# Patient Record
Sex: FEMALE | Race: WHITE | Employment: FULL TIME | URBAN - METROPOLITAN AREA
[De-identification: names, ages, dates, MRNs, and addresses within clinical notes are randomized per-mention and may not be internally consistent; named-entity substitution may affect disease eponyms.]

---

## 2018-09-06 ENCOUNTER — HOSPITAL ENCOUNTER (OUTPATIENT)
Dept: GENERAL RADIOLOGY | Facility: CLINIC | Age: 28
Discharge: HOME | End: 2018-09-08
Payer: COMMERCIAL

## 2018-09-06 ENCOUNTER — HOSPITAL ENCOUNTER (OUTPATIENT)
Facility: CLINIC | Age: 28
Discharge: HOME | End: 2018-09-08
Payer: COMMERCIAL

## 2018-09-06 DIAGNOSIS — M25.572 LEFT ANKLE PAIN, UNSPECIFIED CHRONICITY: ICD-10-CM

## 2018-09-06 PROCEDURE — 73610 X-RAY EXAM OF ANKLE: CPT

## 2019-12-16 ENCOUNTER — HOSPITAL ENCOUNTER (OUTPATIENT)
Age: 29
Discharge: HOME | End: 2019-12-16
Payer: COMMERCIAL

## 2019-12-16 LAB
ALBUMIN SERPL-MCNC: 3.9 G/DL (ref 3.5–5.2)
ALBUMIN/GLOBULIN RATIO: 1.1 (ref 1–2.5)
ALP BLD-CCNC: 70 U/L (ref 35–104)
ALT SERPL-CCNC: 23 U/L (ref 5–33)
ANION GAP SERPL CALCULATED.3IONS-SCNC: 14 MMOL/L (ref 9–17)
AST SERPL-CCNC: 20 U/L
BILIRUB SERPL-MCNC: 0.28 MG/DL (ref 0.3–1.2)
BILIRUBIN DIRECT: <0.08 MG/DL
BILIRUBIN, INDIRECT: ABNORMAL MG/DL (ref 0–1)
BUN BLDV-MCNC: 7 MG/DL (ref 6–20)
CALCIUM SERPL-MCNC: 9 MG/DL (ref 8.6–10.4)
CHLORIDE BLD-SCNC: 108 MMOL/L (ref 98–107)
CHOLESTEROL/HDL RATIO: 3.1
CHOLESTEROL: 154 MG/DL
CO2: 19 MMOL/L (ref 20–31)
CREAT SERPL-MCNC: 0.61 MG/DL (ref 0.5–0.9)
ESTIMATED AVERAGE GLUCOSE: 97 MG/DL
GFR AFRICAN AMERICAN: >60 ML/MIN
GFR NON-AFRICAN AMERICAN: >60 ML/MIN
GFR SERPL CREATININE-BSD FRML MDRD: NORMAL ML/MIN/{1.73_M2}
GFR SERPL CREATININE-BSD FRML MDRD: NORMAL ML/MIN/{1.73_M2}
GLOBULIN: ABNORMAL G/DL (ref 1.5–3.8)
GLUCOSE BLD-MCNC: 89 MG/DL (ref 70–99)
HBA1C MFR BLD: 5 % (ref 4–6)
HCT VFR BLD CALC: 39.1 % (ref 36.3–47.1)
HDLC SERPL-MCNC: 50 MG/DL
HEMOGLOBIN: 12.8 G/DL (ref 11.9–15.1)
IRON: 44 UG/DL (ref 37–145)
LDL CHOLESTEROL: 85 MG/DL (ref 0–130)
MAGNESIUM: 2.3 MG/DL (ref 1.6–2.6)
MCH RBC QN AUTO: 27.8 PG (ref 25.2–33.5)
MCHC RBC AUTO-ENTMCNC: 32.7 G/DL (ref 28.4–34.8)
MCV RBC AUTO: 85 FL (ref 82.6–102.9)
NRBC AUTOMATED: 0 PER 100 WBC
PDW BLD-RTO: 13.3 % (ref 11.8–14.4)
PLATELET # BLD: 337 K/UL (ref 138–453)
PMV BLD AUTO: 9.8 FL (ref 8.1–13.5)
POTASSIUM SERPL-SCNC: 4.3 MMOL/L (ref 3.7–5.3)
RBC # BLD: 4.6 M/UL (ref 3.95–5.11)
SODIUM BLD-SCNC: 141 MMOL/L (ref 135–144)
T3 TOTAL: 197 NG/DL (ref 80–200)
THYROXINE, FREE: 1.13 NG/DL (ref 0.93–1.7)
TOTAL PROTEIN: 7.3 G/DL (ref 6.4–8.3)
TRIGL SERPL-MCNC: 95 MG/DL
TSH SERPL DL<=0.05 MIU/L-ACNC: 2.42 MIU/L (ref 0.3–5)
VITAMIN B-12: 290 PG/ML (ref 232–1245)
VITAMIN D 25-HYDROXY: 21.3 NG/ML (ref 30–100)
VLDLC SERPL CALC-MCNC: NORMAL MG/DL (ref 1–30)
WBC # BLD: 10.4 K/UL (ref 3.5–11.3)

## 2019-12-16 PROCEDURE — 82306 VITAMIN D 25 HYDROXY: CPT

## 2019-12-16 PROCEDURE — 83540 ASSAY OF IRON: CPT

## 2019-12-16 PROCEDURE — 36415 COLL VENOUS BLD VENIPUNCTURE: CPT

## 2019-12-16 PROCEDURE — 80061 LIPID PANEL: CPT

## 2019-12-16 PROCEDURE — 82310 ASSAY OF CALCIUM: CPT

## 2019-12-16 PROCEDURE — 85027 COMPLETE CBC AUTOMATED: CPT

## 2019-12-16 PROCEDURE — 82607 VITAMIN B-12: CPT

## 2019-12-16 PROCEDURE — 80051 ELECTROLYTE PANEL: CPT

## 2019-12-16 PROCEDURE — 84480 ASSAY TRIIODOTHYRONINE (T3): CPT

## 2019-12-16 PROCEDURE — 82947 ASSAY GLUCOSE BLOOD QUANT: CPT

## 2019-12-16 PROCEDURE — 83735 ASSAY OF MAGNESIUM: CPT

## 2019-12-16 PROCEDURE — 80076 HEPATIC FUNCTION PANEL: CPT

## 2019-12-16 PROCEDURE — 84520 ASSAY OF UREA NITROGEN: CPT

## 2019-12-16 PROCEDURE — 84439 ASSAY OF FREE THYROXINE: CPT

## 2019-12-16 PROCEDURE — 83036 HEMOGLOBIN GLYCOSYLATED A1C: CPT

## 2019-12-16 PROCEDURE — 84443 ASSAY THYROID STIM HORMONE: CPT

## 2019-12-16 PROCEDURE — 82565 ASSAY OF CREATININE: CPT

## 2020-01-13 PROBLEM — E66.9 OBESITY WITH BODY MASS INDEX (BMI) OF 30.0 TO 39.9: Status: ACTIVE | Noted: 2020-01-13

## 2020-01-21 ENCOUNTER — HOSPITAL ENCOUNTER (OUTPATIENT)
Age: 30
Setting detail: SPECIMEN
Discharge: HOME | End: 2020-01-21
Payer: COMMERCIAL

## 2020-01-21 LAB
C-REACTIVE PROTEIN: 10.5 MG/L (ref 0–5)
SEDIMENTATION RATE, ERYTHROCYTE: 15 MM (ref 0–20)

## 2020-01-22 LAB — ANTI-NUCLEAR ANTIBODY (ANA): NEGATIVE

## 2020-02-21 PROBLEM — R00.2 PALPITATIONS: Status: ACTIVE | Noted: 2020-02-21

## 2020-02-21 PROBLEM — I44.1 WENCKEBACH BLOCK: Status: ACTIVE | Noted: 2020-02-21

## 2020-10-27 ENCOUNTER — OFFICE VISIT (OUTPATIENT)
Dept: FAMILY MEDICINE CLINIC | Age: 30
End: 2020-10-27
Payer: COMMERCIAL

## 2020-10-27 VITALS
WEIGHT: 248 LBS | OXYGEN SATURATION: 99 % | DIASTOLIC BLOOD PRESSURE: 86 MMHG | HEART RATE: 94 BPM | SYSTOLIC BLOOD PRESSURE: 134 MMHG

## 2020-10-27 PROCEDURE — 99214 OFFICE O/P EST MOD 30 MIN: CPT | Performed by: INTERNAL MEDICINE

## 2020-10-27 RX ORDER — SUMATRIPTAN 50 MG/1
50 TABLET, FILM COATED ORAL
Qty: 9 TABLET | Refills: 1 | Status: SHIPPED | OUTPATIENT
Start: 2020-10-27 | End: 2022-01-21

## 2020-10-27 RX ORDER — NORETHINDRONE ACETATE AND ETHINYL ESTRADIOL 1; 20 MG/1; UG/1
20 TABLET ORAL
COMMUNITY
Start: 2020-10-06 | End: 2022-01-21

## 2020-10-27 RX ORDER — BUDESONIDE AND FORMOTEROL FUMARATE DIHYDRATE 160; 4.5 UG/1; UG/1
160 AEROSOL RESPIRATORY (INHALATION)
COMMUNITY
Start: 2020-09-28 | End: 2021-08-06 | Stop reason: SDUPTHER

## 2020-10-27 RX ORDER — DULOXETIN HYDROCHLORIDE 20 MG/1
40 CAPSULE, DELAYED RELEASE ORAL DAILY
COMMUNITY
Start: 2020-10-07

## 2020-10-27 ASSESSMENT — PATIENT HEALTH QUESTIONNAIRE - PHQ9
SUM OF ALL RESPONSES TO PHQ9 QUESTIONS 1 & 2: 0
1. LITTLE INTEREST OR PLEASURE IN DOING THINGS: 0
SUM OF ALL RESPONSES TO PHQ QUESTIONS 1-9: 0
2. FEELING DOWN, DEPRESSED OR HOPELESS: 0

## 2020-10-27 NOTE — PROGRESS NOTES
[address]  [address]  Dept: [phone]  Dept Fax: [phone]: [phone]     Visit Date:  10/27/2020    Patient:  Josephine Haines  YOB: 1990    HPI:   Josephine Haines presents today for   Chief Complaint   Patient presents with    New Patient    Migraine     patient is here today to discuss headaches that are ongoing and are getting worse. Moon Higgins HPI 51-year-old female is coming in today to discuss about ongoing acute on chronic worsening headaches. Medical history is positive for chronic anxiety/depression and anxiety positive YANIRA as well as probable migraine and Mobitz type I Wenckebach atrioventricular block. She has been having multiple headaches for the past 1 week. She usually gets migraines once or twice in the month but for the past few months it has been happening 4-5 times on a monthly basis. Migraines are different from her chronic headaches. Its the daily headaches that are making things tough. Any kind of Head movements makes it worse and she does have associated nausea with head movements. She has been having headaches every other day and migraine every 2 weeks for now. She does have little bit of stuffy nose but denies any chronic allergies or sinusitis related issues. No autonomic symptoms no history of cluster headaches or tension headaches from what she knows. In regard to her work- has been stressful in the past couple of weeks she started a new position. She bought Excedrin recently and just took 1 and denies overuse of NSAIDs or caffeine related products or caffeine withdrawals. She does report drinking Pepsi with caffeine 16ounces per day. Her eyes checked over an year ago and denies any blurring of vision double double vision or any problems with eyesight.     Her headaches are mostly behind the left eye retro-orbital headache almost like a stabbing patient pain and sometimes her whole head hurts all over. Headaches are the ones a turn into migraines. Putting ice on her neck helps with these headaches. Light noise makes the migraine headaches worse and they are associated with nausea and vomiting. she used to have once or twice a year of breakthrough headaches not as bad as what it has been. Denies any Neurological symptoms no family history of migraines. RePorts seeing a neurologist back in high school in 2009 for briefly. Red flags associated with headaches:  Systemic symptoms including fever-None  ? Neoplasm history-None  ? Neurologic deficit (including decreased consciousness)-Non-  ? Onset is sudden or abrupt-None  ? Older age (onset after age 48 years)-None  ? Pattern change or recent onset of new headache-None  ? Positional headache-None  ? Precipitated by sneezing, coughing, or exercise-None  ? Papilledema-None  ? Progressive headache and atypical presentations-None  ? Pregnancy or puerperium-NONE  ? Painful eye with autonomic features-None  ? Post-traumatic onset of headache-None  ? Pathology of the immune system such as HIV-None  ? Painkiller (analgesic) overuse (eg, medication overuse headache) or new drug at onset of headache-None               Medications  Prior to Visit Medications    Medication Sig Taking? Authorizing Provider   DULoxetine (CYMBALTA) 20 MG extended release capsule Take 40 mg by mouth daily  Yes Historical Provider, MD   AUROVELA 1/20 1-20 MG-MCG per tablet 20 mcg Yes Historical Provider, MD   SYMBICORT 160-4.5 MCG/ACT AERO 160 mcg Yes Historical Provider, MD        Allergies:  is allergic to amoxicillin and penicillin g. Past Medical History:   has a past medical history of Anxiety, Asthma, Depression, Headache, and Obesity. Past Surgical History   has a past surgical history that includes Gallbladder surgery (2017) and [place] tooth extraction (2011).     Family History  family history includes Breast Cancer in her maternal grandmother; Cancer in her maternal grandfather; Depression in her mother; Obesity in her father. Social History   reports that she has never smoked. She has never used smokeless tobacco. She reports previous alcohol use. She reports that she does not use drugs. Health Maintenance:    Health Maintenance   Topic Date Due    Varicella vaccine (1 of 2 - 2-dose childhood series) 03/31/1993    HIV screen  03/31/2007    DTaP/Tdap/Td vaccine (1 - Tdap) 03/31/2011    Cervical cancer screen  03/31/2013    Flu vaccine (1) 09/01/2020    Hepatitis A vaccine  Aged Out    Hepatitis B vaccine  Aged Out    Hib vaccine  Aged Out    Meningococcal (ACWY) vaccine  Aged Out    Pneumococcal 0-64 years Vaccine  Aged Out       Subjective:      Review of Systems   Constitutional: Negative for fatigue, fever and unexpected weight change. HENT: Negative for ear pain, postnasal drip, rhinorrhea, sinus pain, sore throat and trouble swallowing. Eyes: Negative for visual disturbance. Respiratory: Negative for cough, chest tightness and shortness of breath. Cardiovascular: Negative for chest pain and leg swelling. Gastrointestinal: Negative for abdominal pain, blood in stool and diarrhea. Endocrine: Negative for polyuria. Genitourinary: Negative for difficulty urinating and flank pain. Musculoskeletal: Negative for arthralgias, joint swelling and myalgias. Skin: Negative for rash. Allergic/Immunologic: Negative for environmental allergies. Neurological: Positive for headaches. Negative for weakness, light-headedness and numbness. Hematological: Negative for adenopathy. Psychiatric/Behavioral: Negative for behavioral problems and suicidal ideas. The patient is not nervous/anxious. Objective:     /86 (Site: Right Upper Arm, Position: Sitting)   Pulse 94   Wt 248 lb (112.5 kg)   SpO2 99%     Physical Exam  Vitals signs and nursing note reviewed.    Constitutional:       Appearance: She is obese. HENT:      Head: Normocephalic and atraumatic. Cardiovascular:      Rate and Rhythm: Normal rate and regular rhythm. Pulses: Normal pulses. Heart sounds: Normal heart sounds. Pulmonary:      Effort: Pulmonary effort is normal.      Breath sounds: Normal breath sounds. No stridor. Abdominal:      General: Abdomen is flat. Bowel sounds are normal.      Palpations: Abdomen is soft. Musculoskeletal: Normal range of motion. Right lower leg: No edema. Left lower leg: No edema. Skin:     General: Skin is warm. Neurological:      General: No focal deficit present. Mental Status: She is oriented to person, place, and time. Mental status is at baseline. Cranial Nerves: Cranial nerves are intact. No cranial nerve deficit. Sensory: Sensation is intact. No sensory deficit. Motor: Tremor present. No weakness, abnormal muscle tone or pronator drift. Coordination: Romberg sign negative. Coordination normal. Finger-Nose-Finger Test and Heel to [facility] Test normal. Rapid alternating movements normal.      Gait: Gait is intact. Gait normal.      Deep Tendon Reflexes: Reflexes normal. Babinski sign absent on the right side. Babinski sign absent on the left side. Reflex Scores:       Tricep reflexes are 2+ on the right side and 2+ on the left side. Bicep reflexes are 2+ on the right side and 2+ on the left side. Brachioradialis reflexes are 2+ on the right side and 2+ on the left side. Patellar reflexes are 2+ on the right side and 2+ on the left side. Achilles reflexes are 2+ on the right side and 2+ on the left side. Comments: +tremors noticed on outstretched hands     Psychiatric:         Mood and Affect: Mood normal.             Assessment       Diagnosis Orders   1. Depression, unspecified depression type  SUMAtriptan (IMITREX) 50 MG tablet   2. Anxiety  SUMAtriptan (IMITREX) 50 MG tablet   3.  Positive YANIRA (antinuclear antibody) SUMAtriptan (IMITREX) 50 MG tablet   4. Atrioventricular block, Mobitz type 1, Wenckebach  SUMAtriptan (IMITREX) 50 MG tablet   5. Nonintractable headache, unspecified chronicity pattern, unspecified headache type  SUMAtriptan (IMITREX) 50 MG tablet   6. Other migraine without status migrainosus, not intractable  SUMAtriptan (IMITREX) 50 MG tablet         PLAN   Discussed extensively about her headaches/migraines and treatment options. Given her history patient is resistant to starting any prophylactic therapy for chronic migraines. Will do a trial of Imitrex on as needed basis as an abortive therapy. The neck exercises/Ice/Heat pads/ massages /mindfulness yoga meditation should be adapted losing weight will also help. Recommend seeing cardiology in the near future. No orders of the defined types were placed in this encounter. No follow-ups on file. Patient given educational materials - see patient instructions. Discussed use, benefit, and side effects of prescribed medications. All patient questions answered. Pt voiced understanding. Reviewed health maintenance.        Electronically signed Judith Marquez MD on 10/27/2020 at 8:31 AM EDT

## 2020-10-30 ASSESSMENT — ENCOUNTER SYMPTOMS
COUGH: 0
CHEST TIGHTNESS: 0
RHINORRHEA: 0
SINUS PAIN: 0
SHORTNESS OF BREATH: 0
DIARRHEA: 0
SORE THROAT: 0
BLOOD IN STOOL: 0
ABDOMINAL PAIN: 0
TROUBLE SWALLOWING: 0

## 2020-11-23 ENCOUNTER — VIRTUAL VISIT (OUTPATIENT)
Dept: FAMILY MEDICINE CLINIC | Age: 30
End: 2020-11-23
Payer: COMMERCIAL

## 2020-11-23 NOTE — PROGRESS NOTES
[address]  [address]  Dept: [phone]  Dept Fax: [phone]: [phone]     Visit Date:  11/23/2020    Patient:  Keshia Montgomery  YOB: 1990    HPI:   Keshia Montgomery presents today for   Chief Complaint   Patient presents with    Generalized Body Aches     patient is being seen for body aches, she has pain in her rib cage specifically the left side    . AUDIO/VIDEO CALL DUE TO COVID 23   HPI 72-year-old female Medical history is positive for chronic anxiety/depression and anxiety positive YANIRA as well as probable migraine and Mobitz type I Wenckebach atrioventricular block    Patient reports this past Saturday morning she woke up pretty sore and stiff all over. She has been having excruciating pain on her left rib area and has been progressive in the past few days x 3 weeks. She feels like every time she is tries to reach out it hurts and she is been having some discomfort around her triceps area as well on the right upper extremity. Is also noticed she has been having like muscle cramps and pain in her posterior calf and discomfort when she is stretching denies any swelling in her lower extremity. Last night she tried reaching out for things at the counter top and her calves/leg muscles were hurting. No history is of DVTs or PEs from what she knows. RePort because of the left upper quadrant rib cage pain she has been having some difficulty with breathing/pleurisy. It hurts when she takes deep breaths. She is status post cholecystectomy denies any other GI  UTI-like symptoms no histories of kidney stones nausea vomiting chest pain angina shortness of breath palpitations lightheadedness dizziness-like episodes no travel history no sick contacts no Covid related exposures.   No coughing no stuffy nose no loss of sensation of taste or smell or any other headaches 09/01/2020    Hepatitis A vaccine  Aged Out    Hepatitis B vaccine  Aged Out    Hib vaccine  Aged Out    Meningococcal (ACWY) vaccine  Aged Out    Pneumococcal 0-64 years Vaccine  Aged Out       Subjective:      Review of Systems   Constitutional: Negative for fatigue, fever and unexpected weight change. HENT: Negative for ear pain, postnasal drip, rhinorrhea, sinus pain, sore throat and trouble swallowing. Eyes: Negative for visual disturbance. Respiratory: Negative for cough, chest tightness and shortness of breath. Cardiovascular: Negative for chest pain and leg swelling. Gastrointestinal: Positive for abdominal pain. Negative for blood in stool and diarrhea. Endocrine: Negative for polyuria. Genitourinary: Negative for difficulty urinating and flank pain. Musculoskeletal: Positive for arthralgias and myalgias. Negative for joint swelling. Muscle sores/stiff  Calf/legs hurting. Skin: Negative for rash. Allergic/Immunologic: Negative for environmental allergies. Neurological: Negative for weakness, light-headedness, numbness and headaches. Hematological: Negative for adenopathy. Psychiatric/Behavioral: Negative for behavioral problems and suicidal ideas. The patient is not nervous/anxious. Objective: There were no vitals taken for this visit. Physical Exam        Assessment       Diagnosis Orders   1. Pleurisy  XR CHEST STANDARD (2 VW)    Amylase    Lipase    Sedimentation Rate    C-Reactive Protein    Hemoglobin A1C    Lipid Panel    Brain Natriuretic Peptide    Troponin I    CK MB    CK    D-Dimer, Quantitative    CBC With Auto Differential    Comprehensive Metabolic Panel    TSH    T4, Free   2.  Rib cage region somatic dysfunction  XR CHEST STANDARD (2 VW)    Amylase    Lipase    Sedimentation Rate    C-Reactive Protein    Hemoglobin A1C    Lipid Panel    Brain Natriuretic Peptide    Troponin I    CK MB    CK    D-Dimer, Quantitative    CBC With Auto Differential    Comprehensive Metabolic Panel    TSH    T4, Free   3. Shortness of breath  XR CHEST STANDARD (2 VW)    Amylase    Lipase    Sedimentation Rate    C-Reactive Protein    Hemoglobin A1C    Lipid Panel    Brain Natriuretic Peptide    Troponin I    CK MB    CK    D-Dimer, Quantitative    CBC With Auto Differential    Comprehensive Metabolic Panel    TSH    T4, Free   4. Left upper quadrant abdominal pain  XR CHEST STANDARD (2 VW)    Amylase    Lipase    Sedimentation Rate    C-Reactive Protein    Hemoglobin A1C    Lipid Panel    Brain Natriuretic Peptide    Troponin I    CK MB    CK    D-Dimer, Quantitative    CBC With Auto Differential    Comprehensive Metabolic Panel    TSH    T4, Free   5. Bilateral calf pain  XR CHEST STANDARD (2 VW)    Amylase    Lipase    Sedimentation Rate    C-Reactive Protein    Hemoglobin A1C    Lipid Panel    Brain Natriuretic Peptide    Troponin I    CK MB    CK    D-Dimer, Quantitative    CBC With Auto Differential    Comprehensive Metabolic Panel    TSH    T4, Free   6. Depression, unspecified depression type  XR CHEST STANDARD (2 VW)    Amylase    Lipase    Sedimentation Rate    C-Reactive Protein    Hemoglobin A1C    Lipid Panel    Brain Natriuretic Peptide    Troponin I    CK MB    CK    D-Dimer, Quantitative    CBC With Auto Differential    Comprehensive Metabolic Panel    TSH    T4, Free   7. Anxiety  XR CHEST STANDARD (2 VW)    Amylase    Lipase    Sedimentation Rate    C-Reactive Protein    Hemoglobin A1C    Lipid Panel    Brain Natriuretic Peptide    Troponin I    CK MB    CK    D-Dimer, Quantitative    CBC With Auto Differential    Comprehensive Metabolic Panel    TSH    T4, Free   8.  Other migraine without status migrainosus, not intractable  XR CHEST STANDARD (2 VW)    Amylase    Lipase    Sedimentation Rate    C-Reactive Protein    Hemoglobin A1C    Lipid Panel    Brain Natriuretic Peptide    Troponin I    CK MB    CK    D-Dimer, Quantitative    CBC With Auto Standing Expiration Date:   11/23/2021    Lipase     Standing Status:   Future     Standing Expiration Date:   11/23/2021    Sedimentation Rate     Standing Status:   Future     Standing Expiration Date:   11/23/2021    C-Reactive Protein     Standing Status:   Future     Standing Expiration Date:   11/23/2021    Hemoglobin A1C     Standing Status:   Future     Standing Expiration Date:   11/23/2021    Lipid Panel     Standing Status:   Future     Standing Expiration Date:   11/23/2021     Order Specific Question:   Is Patient Fasting?/# of Hours     Answer:   12 hours    Brain Natriuretic Peptide     Standing Status:   Future     Standing Expiration Date:   11/23/2021    Troponin I     Standing Status:   Future     Standing Expiration Date:   11/23/2021    CK MB     Standing Status:   Future     Standing Expiration Date:   11/23/2021    CK     Standing Status:   Future     Standing Expiration Date:   11/23/2021    D-Dimer, Quantitative     Standing Status:   Future     Standing Expiration Date:   11/23/2021    CBC With Auto Differential     Standing Status:   Future     Standing Expiration Date:   11/23/2021    Comprehensive Metabolic Panel     Standing Status:   Future     Standing Expiration Date:   11/23/2021    TSH     Standing Status:   Future     Standing Expiration Date:   11/23/2021    T4, Free     Standing Status:   Future     Standing Expiration Date:   11/23/2021        No follow-ups on file. Patient given educational materials - see patient instructions. Discussed use, benefit, and side effects of prescribed medications. All patient questions answered. Pt voiced understanding. Reviewed health maintenance.        Electronically signed Patric Segura MD on 11/23/2020 at 1:14 PM EST

## 2020-11-25 ASSESSMENT — ENCOUNTER SYMPTOMS
BLOOD IN STOOL: 0
DIARRHEA: 0
COUGH: 0
SORE THROAT: 0
CHEST TIGHTNESS: 0
SHORTNESS OF BREATH: 0
ABDOMINAL PAIN: 1
SINUS PAIN: 0
RHINORRHEA: 0
TROUBLE SWALLOWING: 0

## 2020-11-30 ENCOUNTER — VIRTUAL VISIT (OUTPATIENT)
Dept: FAMILY MEDICINE CLINIC | Age: 30
End: 2020-11-30
Payer: COMMERCIAL

## 2020-11-30 NOTE — PROGRESS NOTES
[address]  [address]  Dept: [phone]  Dept Fax: [phone]: [phone]     Visit Date:  11/30/2020    Patient:  Juan C Thomas  YOB: 1990    HPI:   Juan C Thomas presents today for   Chief Complaint   Patient presents with   [address]     patient is being seen today to discuss lab results   . AUDIO/VIDEO CALL DUE TO COVID 23   HPI 82-year-old female Medical history is positive for chronic anxiety/depression and anxiety positive YANIRA as well as probable migraine and Mobitz type I Wenckebach atrioventricular block. Family history:  Breast cancer-grandmother- older  Melanoma-dx grandmother-older  Bladder cancer-grandpa    Since We spoke last time patient denies any more left rib pain that she was experiencing before that has stopped. Blood test/imaging showed normal chest x-ray as well as slightly elevated glucose of 111, elevated CRP levels of 2.9, C5 0.3, D-dimer was normal, CK-MB/trops/amylase/lipase was normal, elevated ESR of 41. Normal thyroid studies as well. Patient does report chronic history of polyarthralgias myalgias fatigue and hair loss and with slightly elevated ESR and CRP on her recent labs. Please note her platelet counts are also elevated to 414 with normal other cell lines. No bleeding or clotting issues from what she knows. Medications  Prior to Visit Medications    Medication Sig Taking? Authorizing Provider   DULoxetine (CYMBALTA) 20 MG extended release capsule Take 40 mg by mouth daily  Yes Historical Provider, MD   AUROVELA 1/20 1-20 MG-MCG per tablet 20 mcg Yes Historical Provider, MD   SYMBICORT 160-4.5 MCG/ACT AERO 160 mcg Yes Historical Provider, MD   SUMAtriptan (IMITREX) 50 MG tablet Take 1 tablet by mouth once as needed for Migraine Initial: Usual: 50 to 100 mg once.  If initial dose was partially effective or headache recurs, may repeat a dose (usually same as first dose) after >2 hours. Maximum dose: 100 mg per dose; 200 mg per 24 hours  Bong Leon MD        Allergies:  is allergic to amoxicillin and penicillin g. Past Medical History:   has a past medical history of Anxiety, Asthma, Depression, Headache, and Obesity. Past Surgical History   has a past surgical history that includes Gallbladder surgery (2017) and [place] tooth extraction (2011). Family History  family history includes Breast Cancer in her maternal grandmother; Cancer in her maternal grandfather; Depression in her mother; Obesity in her father. Social History   reports that she has never smoked. She has never used smokeless tobacco. She reports previous alcohol use. She reports that she does not use drugs. Health Maintenance:    Health Maintenance   Topic Date Due    Varicella vaccine (1 of 2 - 2-dose childhood series) 03/31/1993    HIV screen  03/31/2007    DTaP/Tdap/Td vaccine (1 - Tdap) 03/31/2011    Cervical cancer screen  03/31/2013    Flu vaccine (1) 09/01/2020    Hepatitis A vaccine  Aged Out    Hepatitis B vaccine  Aged Out    Hib vaccine  Aged Out    Meningococcal (ACWY) vaccine  Aged Out    Pneumococcal 0-64 years Vaccine  Aged Out       Subjective:      Review of Systems   Constitutional: Positive for fatigue. Negative for fever and unexpected weight change. Hair loss   HENT: Negative for ear pain, postnasal drip, rhinorrhea, sinus pain, sore throat and trouble swallowing. Eyes: Negative for visual disturbance. Respiratory: Negative for cough, chest tightness and shortness of breath. Cardiovascular: Negative for chest pain and leg swelling. Gastrointestinal: Negative for abdominal pain, blood in stool and diarrhea. Endocrine: Negative for polyuria. Genitourinary: Negative for difficulty urinating and flank pain. Musculoskeletal: Positive for arthralgias and myalgias. Negative for joint swelling.    Skin: Negative for rash. Allergic/Immunologic: Negative for environmental allergies. Neurological: Negative for weakness, light-headedness, numbness and headaches. Hematological: Negative for adenopathy. Psychiatric/Behavioral: Negative for behavioral problems and suicidal ideas. The patient is not nervous/anxious. Objective: There were no vitals taken for this visit. Physical Exam        Assessment       Diagnosis Orders   1. Hair loss  YANIRA Screen With Reflex    Urinalysis With Microscopic    Sedimentation Rate    C-Reactive Protein    CBC With Auto Differential   2. Polyarthralgia  YANIRA Screen With Reflex    Urinalysis With Microscopic    Sedimentation Rate    C-Reactive Protein    CBC With Auto Differential   3. Elevated C-reactive protein (CRP)  YANIRA Screen With Reflex    Urinalysis With Microscopic    Sedimentation Rate    C-Reactive Protein    CBC With Auto Differential   4. ESR raised  YANIRA Screen With Reflex    Urinalysis With Microscopic    Sedimentation Rate    C-Reactive Protein    CBC With Auto Differential   5. Elevated platelet count  YANIRA Screen With Reflex    Urinalysis With Microscopic    Sedimentation Rate    C-Reactive Protein    CBC With Auto Differential         PLAN   Repeat some of the labs in next 2-4 weeks again. Encourage weight loss. Vicki Frost is a 31 y.o. female being evaluated by a Virtual Visit (video visit) encounter to address concerns as mentioned above. A caregiver was present when appropriate. Due to this being a TeleHealth encounter (During NXXUA-05 public health emergency), evaluation of the following organ systems was limited: Vitals/Constitutional/EENT/Resp/CV/GI//MS/Neuro/Skin/Heme-Lymph-Imm.   Pursuant to the emergency declaration under the [facility], [facility]5 waiver authority and the [employer] Act, this Virtual Visit was conducted with patient's (and/or legal guardian's) consent, to reduce the patient's risk of exposure to COVID-19 and provide necessary medical care. The patient (and/or legal guardian) has also been advised to contact this office for worsening conditions or problems, and seek emergency medical treatment and/or call 911 if deemed necessary. Patient identification was verified at the start of the visit: Yes    Total time spent for this encounter: 30 mins    Services were provided through a video synchronous discussion virtually to substitute for in-person clinic visit. Patient and provider were located at their individual homes. --Neil Chavez MD on 12/4/2020 at 10:32 AM    An electronic signature was used to authenticate this note. Orders Placed This Encounter   Procedures    YANIRA Screen With Reflex     Standing Status:   Future     Standing Expiration Date:   11/30/2021    Urinalysis With Microscopic     Standing Status:   Future     Standing Expiration Date:   11/30/2021     Order Specific Question:   SPECIFY(EX-CATH,MIDSTREAM,CYSTO,ETC)? Answer:   MID STREAM    Sedimentation Rate     Standing Status:   Future     Standing Expiration Date:   11/30/2021    C-Reactive Protein     Standing Status:   Future     Standing Expiration Date:   11/30/2021    CBC With Auto Differential     Standing Status:   Future     Standing Expiration Date:   11/30/2021        No follow-ups on file. Patient given educational materials - see patient instructions. Discussed use, benefit, and side effects of prescribed medications. All patient questions answered. Pt voiced understanding. Reviewed health maintenance.        Electronically signed Remi Goel MD on 11/30/2020 at 11:47 AM EST

## 2020-12-04 ASSESSMENT — ENCOUNTER SYMPTOMS
COUGH: 0
ABDOMINAL PAIN: 0
DIARRHEA: 0
SORE THROAT: 0
SINUS PAIN: 0
TROUBLE SWALLOWING: 0
SHORTNESS OF BREATH: 0
RHINORRHEA: 0
CHEST TIGHTNESS: 0
BLOOD IN STOOL: 0

## 2020-12-14 ENCOUNTER — HOSPITAL ENCOUNTER (OUTPATIENT)
Age: 30
Setting detail: SPECIMEN
Discharge: HOME | End: 2020-12-14
Payer: COMMERCIAL

## 2020-12-14 LAB
-: ABNORMAL
ABSOLUTE EOS #: 0.27 K/UL (ref 0–0.44)
ABSOLUTE IMMATURE GRANULOCYTE: 0.04 K/UL (ref 0–0.3)
ABSOLUTE LYMPH #: 2.7 K/UL (ref 1.1–3.7)
ABSOLUTE MONO #: 0.56 K/UL (ref 0.1–1.2)
AMORPHOUS: ABNORMAL
BACTERIA: ABNORMAL
BASOPHILS # BLD: 1 % (ref 0–2)
BASOPHILS ABSOLUTE: 0.07 K/UL (ref 0–0.2)
BILIRUBIN URINE: NEGATIVE
CASTS UA: ABNORMAL /LPF (ref 0–2)
COLOR: ABNORMAL
COMMENT UA: ABNORMAL
CRYSTALS, UA: ABNORMAL /HPF
DIFFERENTIAL TYPE: NORMAL
EOSINOPHILS RELATIVE PERCENT: 3 % (ref 1–4)
EPITHELIAL CELLS UA: ABNORMAL /HPF (ref 0–5)
GLUCOSE URINE: NEGATIVE
HCT VFR BLD CALC: 38.9 % (ref 36.3–47.1)
HEMOGLOBIN: 12.9 G/DL (ref 11.9–15.1)
IMMATURE GRANULOCYTES: 0 %
KETONES, URINE: NEGATIVE
LEUKOCYTE ESTERASE, URINE: NEGATIVE
LYMPHOCYTES # BLD: 27 % (ref 24–43)
MCH RBC QN AUTO: 27.9 PG (ref 25.2–33.5)
MCHC RBC AUTO-ENTMCNC: 33.2 G/DL (ref 25.2–33.5)
MCV RBC AUTO: 84.2 FL (ref 82.6–102.9)
MONOCYTES # BLD: 6 % (ref 3–12)
MUCUS: ABNORMAL
NITRITE, URINE: NEGATIVE
NRBC AUTOMATED: 0 PER 100 WBC
OTHER OBSERVATIONS UA: ABNORMAL
PDW BLD-RTO: 13.7 % (ref 11.8–14.4)
PH UA: 6 (ref 5–6)
PLATELET # BLD: 400 K/UL (ref 138–453)
PLATELET ESTIMATE: NORMAL
PMV BLD AUTO: 9.4 FL (ref 8.1–13.5)
PROTEIN UA: NEGATIVE
RBC # BLD: 4.62 M/UL (ref 3.95–5.11)
RBC # BLD: NORMAL 10*6/UL
RBC UA: ABNORMAL /HPF (ref 0–4)
RENAL EPITHELIAL, UA: ABNORMAL /HPF
SEDIMENTATION RATE, ERYTHROCYTE: 38 MM (ref 0–20)
SEG NEUTROPHILS: 63 % (ref 36–65)
SEGMENTED NEUTROPHILS ABSOLUTE COUNT: 6.32 K/UL (ref 1.5–8.1)
SPECIFIC GRAVITY UA: 1.02 (ref 1.01–1.02)
TRICHOMONAS: ABNORMAL
TURBIDITY: ABNORMAL
URINE HGB: NEGATIVE
UROBILINOGEN, URINE: NORMAL
WBC # BLD: 10 K/UL (ref 3.5–11.3)
WBC # BLD: NORMAL 10*3/UL
WBC UA: ABNORMAL /HPF (ref 0–4)
YEAST: ABNORMAL

## 2020-12-14 PROCEDURE — 85025 COMPLETE CBC W/AUTO DIFF WBC: CPT

## 2020-12-14 PROCEDURE — 86038 ANTINUCLEAR ANTIBODIES: CPT

## 2020-12-14 PROCEDURE — 85651 RBC SED RATE NONAUTOMATED: CPT

## 2020-12-14 PROCEDURE — 81001 URINALYSIS AUTO W/SCOPE: CPT

## 2020-12-14 PROCEDURE — 86140 C-REACTIVE PROTEIN: CPT

## 2020-12-15 LAB — C-REACTIVE PROTEIN: 28.7 MG/L (ref 0–5)

## 2020-12-16 LAB — ANTI-NUCLEAR ANTIBODY (ANA): NEGATIVE

## 2020-12-22 ENCOUNTER — TELEPHONE (OUTPATIENT)
Dept: FAMILY MEDICINE CLINIC | Age: 30
End: 2020-12-22

## 2020-12-22 NOTE — TELEPHONE ENCOUNTER
Her inflammatory markers are elevated but YANIRA test was negative. I think she should probably see a rheumatology to discuss further.  Let me know how patient feels about it and then I can put in referral.

## 2020-12-22 NOTE — TELEPHONE ENCOUNTER
Patient calling for lab results, has no follow up appointment to discuss. Please contact @ [phone].

## 2020-12-28 ENCOUNTER — VIRTUAL VISIT (OUTPATIENT)
Dept: FAMILY MEDICINE CLINIC | Age: 30
End: 2020-12-28
Payer: COMMERCIAL

## 2020-12-28 PROCEDURE — 99211 OFF/OP EST MAY X REQ PHY/QHP: CPT | Performed by: INTERNAL MEDICINE

## 2020-12-28 NOTE — PROGRESS NOTES
[address]  [address]  Dept: [phone]  Dept Fax: [phone]: [phone]     Visit Date:  12/28/2020    Patient:  Lillie Matute  YOB: 1990    HPI:   Lillie Matute presents today for   Chief Complaint   Patient presents with   [address]     patient is would like to discuss her lab results   . AUDIO/VIDEO CALL DUE TO COVID 23   HPI  82-year-old female Medical history is positive for chronic anxiety/depression and anxiety ? positive YANIRA as well as probable migraine and Mobitz type I Wenckebach atrioventricular block. Family history:  Breast cancer-grandmother- older;Melanoma-dx grandmother-older;Bladder cancer-grandpa. Polyarthralgias/ myalgias/ fatigue/ elevated ESR and CRP negative YANIRA-patient's elevated inflammatory markers and persistent symptoms of polyarthralgias myalgias and fatigue she denies any rheumatological problems her YANIRA test was negative the. No mouth ulcers no family or personal history is of any autoimmune diseases no red hot swollen joints lightheadedness dizziness chest pain chest tightness or any other signs and symptoms concerning for systemic disease. Results for Spenser Marcelino (MRN [id number]) as of 12/31/2020 11:16   Ref. Range 12/14/2020 13:56   Sed Rate Latest Ref Range: 0 - 20 mm 38 (H)     Results for Spenser Marcelino (MRN [id number]) as of 12/31/2020 11:16   Ref. Range 12/14/2020 13:56   CRP Latest Ref Range: 0.0 - 5.0 mg/L 28.7 (H)       From prior notes/labs-  Since We spoke last time patient denies any more left rib pain that she was experiencing before that has stopped. Blood test/imaging showed normal chest x-ray as well as slightly elevated glucose of 111, elevated CRP levels of 2.9, C5 0.3, D-dimer was normal, CK-MB/trops/amylase/lipase was normal, elevated ESR of 41.     Normal thyroid studies as well.   Patient does report chronic history of polyarthralgias myalgias fatigue and hair loss and with slightly elevated ESR and CRP on her recent labs. Please note her platelet counts are also elevated to 414 with normal other cell lines. No bleeding or clotting issues from what she knows. Medications  Prior to Visit Medications    Medication Sig Taking? Authorizing Provider   DULoxetine (CYMBALTA) 20 MG extended release capsule Take 40 mg by mouth daily  Yes Historical Provider, MD   AUROVELA 1/20 1-20 MG-MCG per tablet 20 mcg Yes Historical Provider, MD   SYMBICORT 160-4.5 MCG/ACT AERO 160 mcg Yes Historical Provider, MD   SUMAtriptan (IMITREX) 50 MG tablet Take 1 tablet by mouth once as needed for Migraine Initial: Usual: 50 to 100 mg once. If initial dose was partially effective or headache recurs, may repeat a dose (usually same as first dose) after >2 hours. Maximum dose: 100 mg per dose; 200 mg per 24 hours  Reese Zheng MD        Allergies:  is allergic to amoxicillin and penicillin g. Past Medical History:   has a past medical history of Anxiety, Asthma, Depression, Headache, and Obesity. Past Surgical History   has a past surgical history that includes Gallbladder surgery (2017) and [place] tooth extraction (2011). Family History  family history includes Breast Cancer in her maternal grandmother; Cancer in her maternal grandfather; Depression in her mother; Obesity in her father. Social History   reports that she has never smoked. She has never used smokeless tobacco. She reports previous alcohol use. She reports that she does not use drugs.     Health Maintenance:    Health Maintenance   Topic Date Due    Hepatitis C screen  1990    Varicella vaccine (1 of 2 - 2-dose childhood series) 03/31/1993    HIV screen  03/31/2007    DTaP/Tdap/Td vaccine (1 - Tdap) 03/31/2011    Cervical cancer screen  03/31/2013    Flu vaccine (1) 09/01/2020    Hepatitis A vaccine  Aged Out    Hepatitis B vaccine  Aged Out    Hib vaccine  Aged Out    Meningococcal (ACWY) vaccine  Aged Out    Pneumococcal 0-64 years Vaccine  Aged Out       Subjective:      Review of Systems   Constitutional: Negative for fatigue, fever and unexpected weight change. HENT: Negative for ear pain, postnasal drip, rhinorrhea, sinus pain, sore throat and trouble swallowing. Eyes: Negative for visual disturbance. Respiratory: Negative for cough, chest tightness and shortness of breath. Cardiovascular: Negative for chest pain and leg swelling. Gastrointestinal: Negative for abdominal pain, blood in stool and diarrhea. Endocrine: Negative for polyuria. Genitourinary: Negative for difficulty urinating and flank pain. Musculoskeletal: Positive for arthralgias and myalgias. Negative for joint swelling. Skin: Negative for rash. Allergic/Immunologic: Negative for environmental allergies. Neurological: Negative for weakness, light-headedness, numbness and headaches. Hematological: Negative for adenopathy. Psychiatric/Behavioral: Negative for behavioral problems and suicidal ideas. The patient is not nervous/anxious. Objective: There were no vitals taken for this visit. Physical Exam        Assessment       Diagnosis Orders   1. Other fatigue  External Referral To Rheumatology   2. ESR raised  External Referral To Rheumatology   3. Elevated C-reactive protein (CRP)  External Referral To Rheumatology   4. Polyarthralgia  External Referral To Rheumatology   5. Myalgia  External Referral To Rheumatology         PLAN   Rheumatology referral placed for persistent symptoms of fatigue polyarthralgias myalgias elevated inflammatory markers for further evaluation for probable autoimmune disease. Joan Archibald is a 31 y.o. female being evaluated by a Virtual Visit (video visit) encounter to address concerns as mentioned above. A caregiver was present when appropriate.  Due to this being a TeleHealth encounter (During NMVIB-34 public health emergency), evaluation of the following organ systems was limited: Vitals/Constitutional/EENT/Resp/CV/GI//MS/Neuro/Skin/Heme-Lymph-Imm. Pursuant to the emergency declaration under the [facility], [address] [place] [address] authority and the [employer] Act, this Virtual Visit was conducted with patient's (and/or legal guardian's) consent, to reduce the patient's risk of exposure to COVID-19 and provide necessary medical care. The patient (and/or legal guardian) has also been advised to contact this office for worsening conditions or problems, and seek emergency medical treatment and/or call 911 if deemed necessary. Patient identification was verified at the start of the visit: Yes    Total time spent for this encounter: 30 mins    Services were provided through a video synchronous discussion virtually to substitute for in-person clinic visit. Patient and provider were located at their individual homes. --Teresa Carver MD on 12/31/2020 at 1:24 PM    An electronic signature was used to authenticate this note. Orders Placed This Encounter   Procedures    External Referral To Rheumatology     Referral Priority:   Routine     Referral Type:   Eval and Treat     Referral Reason:   Specialty Services Required     Requested Specialty:   Rheumatology     Number of Visits Requested:   1        No follow-ups on file. Patient given educational materials - see patient instructions. Discussed use, benefit, and side effects of prescribed medications. All patient questions answered. Pt voiced understanding. Reviewed health maintenance.        Electronically signed Joan Jay MD on 12/28/2020 at 3:47 PM EST

## 2021-01-14 ENCOUNTER — TELEPHONE (OUTPATIENT)
Dept: FAMILY MEDICINE CLINIC | Age: 31
End: 2021-01-14

## 2021-05-20 ENCOUNTER — OFFICE VISIT (OUTPATIENT)
Dept: FAMILY MEDICINE CLINIC | Age: 31
End: 2021-05-20
Payer: COMMERCIAL

## 2021-05-20 ENCOUNTER — HOSPITAL ENCOUNTER (OUTPATIENT)
Age: 31
Setting detail: SPECIMEN
Discharge: HOME | End: 2021-05-20
Payer: COMMERCIAL

## 2021-05-20 VITALS
WEIGHT: 257 LBS | SYSTOLIC BLOOD PRESSURE: 118 MMHG | DIASTOLIC BLOOD PRESSURE: 72 MMHG | HEART RATE: 94 BPM | OXYGEN SATURATION: 99 %

## 2021-05-20 DIAGNOSIS — R51.9 RIGHT SIDED TEMPORAL HEADACHE: ICD-10-CM

## 2021-05-20 DIAGNOSIS — H53.8 BLURRED VISION: ICD-10-CM

## 2021-05-20 DIAGNOSIS — H52.13 NEARSIGHTEDNESS, BILATERAL: ICD-10-CM

## 2021-05-20 DIAGNOSIS — M47.817 SPONDYLOSIS OF LUMBOSACRAL REGION WITHOUT MYELOPATHY OR RADICULOPATHY: ICD-10-CM

## 2021-05-20 DIAGNOSIS — R79.82 ELEVATED C-REACTIVE PROTEIN (CRP): ICD-10-CM

## 2021-05-20 DIAGNOSIS — M46.1 SACROILIITIS (HCC): ICD-10-CM

## 2021-05-20 DIAGNOSIS — R70.0 ELEVATED ERYTHROCYTE SEDIMENTATION RATE: ICD-10-CM

## 2021-05-20 DIAGNOSIS — H57.11 ORBITAL PAIN, RIGHT: ICD-10-CM

## 2021-05-20 DIAGNOSIS — R70.0 ELEVATED ERYTHROCYTE SEDIMENTATION RATE: Primary | ICD-10-CM

## 2021-05-20 DIAGNOSIS — M53.3 SACROILIAC JOINT DYSFUNCTION OF RIGHT SIDE: ICD-10-CM

## 2021-05-20 LAB — SEDIMENTATION RATE, ERYTHROCYTE: 28 MM (ref 0–20)

## 2021-05-20 PROCEDURE — 86812 HLA TYPING A B OR C: CPT

## 2021-05-20 PROCEDURE — 86140 C-REACTIVE PROTEIN: CPT

## 2021-05-20 PROCEDURE — 99214 OFFICE O/P EST MOD 30 MIN: CPT | Performed by: INTERNAL MEDICINE

## 2021-05-20 PROCEDURE — 36415 COLL VENOUS BLD VENIPUNCTURE: CPT

## 2021-05-20 PROCEDURE — 85651 RBC SED RATE NONAUTOMATED: CPT

## 2021-05-20 NOTE — PROGRESS NOTES
[address]  [address]  Dept: [phone]  Dept Fax: [phone]: [phone]     Visit Date:  5/20/2021    Patient:  Laura Rhodes  YOB: 1990    HPI:   Laura Rhodes presents today for   Chief Complaint   Patient presents with    Other     patient is here for pain in the bone of her right eye socket. this is ongoing for 2 weeks. Siva David HPI 31-year-old female Medical history is positive for chronic anxiety/depression/ anxiety, elevated inflammatory markers as well as probable migraine and Mobitz type I Wenckebach atrioventricular block. Patient is coming in with 2-week history of what she is describing is very tender right eye orbital pain on the lateral side. She went into the [facility] to get for eyes  checked and got a new glasses but the lenses were not the right one so she is having blurriness of vision because of those lenses she reports. She denies double vision or vision loss or any kind of jaw claudication like symptoms. The right side there is no pain with the eye movement. She is nearsightedness her vision on the left side is around -7 right eyes -5 negative eye. She wears bifocals. She has been rubbing her eyes she reports constantly. There is no dryness. She reports when she lays on that side and puts pressure on the right temporal site and pillow touches the area it makes it hurt. She denies any trauma injuries. Reported that she continues to get headaches and this morning almost threw up. No fever no chills she does have history of chronic migraines no redness of the eyes and no allergy-like symptoms.   No muscle aches body aches although she does does have a lot of issues with her chronic lower back but she denies any polymyalgia rheumatica-like symptoms concerning for shoulder stiffness or shoulder pain or decreased range of motion or upper extremity weakness. Right cheek seems to be getting more hotter and red. No facial rash. Medications  Prior to Visit Medications    Medication Sig Taking? Authorizing Provider   DULoxetine (CYMBALTA) 20 MG extended release capsule Take 40 mg by mouth daily   Historical Provider, MD   AUROVELA 1/20 1-20 MG-MCG per tablet 20 mcg  Historical Provider, MD   SYMBICORT 160-4.5 MCG/ACT AERO 160 mcg  Historical Provider, MD   SUMAtriptan (IMITREX) 50 MG tablet Take 1 tablet by mouth once as needed for Migraine Initial: Usual: 50 to 100 mg once. If initial dose was partially effective or headache recurs, may repeat a dose (usually same as first dose) after >2 hours. Maximum dose: 100 mg per dose; 200 mg per 24 hours  Karolina Garcia MD        Allergies:  is allergic to amoxicillin and penicillin g. Past Medical History:   has a past medical history of Anxiety, Asthma, Depression, Headache, and Obesity. Past Surgical History   has a past surgical history that includes Gallbladder surgery (2017) and [place] tooth extraction (2011). Family History  family history includes Breast Cancer in her maternal grandmother; Cancer in her maternal grandfather; Depression in her mother; Obesity in her father. Social History   reports that she has never smoked. She has never used smokeless tobacco. She reports previous alcohol use. She reports that she does not use drugs.     Health Maintenance:    Health Maintenance   Topic Date Due    Hepatitis C screen  Never done    Varicella vaccine (1 of 2 - 2-dose childhood series) Never done    COVID-19 Vaccine (1) Never done    HIV screen  Never done    DTaP/Tdap/Td vaccine (1 - Tdap) Never done    Cervical cancer screen  Never done    Flu vaccine (Season Ended) 09/01/2021    Hepatitis A vaccine  Aged Out    Hepatitis B vaccine  Aged Out    Hib vaccine  Aged Out    Meningococcal (ACWY) vaccine  Aged Out    Pneumococcal 0-64 years Vaccine  Aged Out       Subjective: Breath sounds: Normal breath sounds. No stridor. Abdominal:      General: Abdomen is flat. Bowel sounds are normal.      Palpations: Abdomen is soft. Skin:     General: Skin is warm. Neurological:      Mental Status: She is oriented to person, place, and time. Mental status is at baseline. Psychiatric:         Mood and Affect: Mood normal.             Assessment       Diagnosis Orders   1. Elevated erythrocyte sedimentation rate  [facility]    External Referral To Rheumatology    HLA-B27 Antigen    Sedimentation Rate    C-Reactive Protein   2. Elevated C-reactive protein (CRP)  [facility]    External Referral To Rheumatology    HLA-B27 Antigen    Sedimentation Rate    C-Reactive Protein   3. Right sided temporal headache  [facility]    External Referral To Rheumatology    HLA-B27 Antigen    Sedimentation Rate    C-Reactive Protein   4. Orbital pain, right  [facility]    External Referral To Rheumatology    HLA-B27 Antigen    Sedimentation Rate    C-Reactive Protein   5. Nearsightedness, bilateral  [facility]    External Referral To Rheumatology    HLA-B27 Antigen    Sedimentation Rate    C-Reactive Protein   6. Blurred vision  [facility]    External Referral To Rheumatology    HLA-B27 Antigen    Sedimentation Rate    C-Reactive Protein   7. Sacroiliitis ([address]     8. Sacroiliac joint dysfunction of right side     9. Spondylosis of lumbosacral region without myelopathy or radiculopathy           PLAN   Given her elevated persistent ESR/CRP the past we will repeat inflammatory markers ESR CRP HLA-B27.   I urged her to make an appointment with rheumatologist.  We will start her on prednisone 60 mg daily for now until she sees the rheumatologist.  I am suspecting possible temporal arteritis although unclear but if she starts having visual loss she should go to the emergency department right away. Orders Placed This Encounter   Procedures    HLA-B27 Antigen     Standing Status:   Future     Number of Occurrences:   1     Standing Expiration Date:   5/20/2022    Sedimentation Rate     Standing Status:   Future     Number of Occurrences:   1     Standing Expiration Date:   5/20/2022    C-Reactive Protein     Standing Status:   Future     Number of Occurrences:   1     Standing Expiration Date:   5/20/2022   [facility]     Referral Priority:   Routine     Referral Type:   Eval and Treat     Referral Reason:   Specialty Services Required     Requested Specialty:   Ophthalmology     Number of Visits Requested:   1    External Referral To Rheumatology     Referral Priority:   Routine     Referral Type:   Eval and Treat     Referral Reason:   Specialty Services Required     Requested Specialty:   Rheumatology     Number of Visits Requested:   1        No follow-ups on file. Patient given educational materials - see patient instructions. Discussed use, benefit, and side effects of prescribed medications. All patient questions answered. Pt voiced understanding. Reviewed health maintenance.        Electronically signed Minus MD Seema on 5/20/2021 at 3:17 PM EDT

## 2021-05-21 ENCOUNTER — TELEPHONE (OUTPATIENT)
Dept: FAMILY MEDICINE CLINIC | Age: 31
End: 2021-05-21

## 2021-05-21 DIAGNOSIS — R51.9 TEMPORAL HEADACHE: Primary | ICD-10-CM

## 2021-05-21 DIAGNOSIS — R70.0 ELEVATED ERYTHROCYTE SEDIMENTATION RATE: ICD-10-CM

## 2021-05-21 DIAGNOSIS — H52.13 NEARSIGHTEDNESS, BILATERAL: ICD-10-CM

## 2021-05-21 DIAGNOSIS — R51.9 RIGHT SIDED TEMPORAL HEADACHE: ICD-10-CM

## 2021-05-21 DIAGNOSIS — H53.8 BLURRED VISION: ICD-10-CM

## 2021-05-21 DIAGNOSIS — R79.82 ELEVATED C-REACTIVE PROTEIN (CRP): ICD-10-CM

## 2021-05-21 LAB — C-REACTIVE PROTEIN: 38.9 MG/L (ref 0–5)

## 2021-05-21 RX ORDER — PREDNISONE 20 MG/1
60 TABLET ORAL DAILY
Qty: 90 TABLET | Refills: 0 | Status: SHIPPED | OUTPATIENT
Start: 2021-05-21 | End: 2021-06-20

## 2021-05-21 NOTE — TELEPHONE ENCOUNTER
Per Dr. Watkins Call recommendations since pt is unable to see rheumatology at [address] [place] [address] until 7/30. She would like her to go ahead and schedule an appt with Dr. Jos Goodwin the first rheumatologist she saw for now until she can be seen at [address] [place] [address]. She is also sending her in a script for prednisone 60mg- take once daily until evaluated by rheumatology. I did speak with her and  let her know these recommendations. I also let her know that her ESR was elevated but two labs were still pending. Pt verbalized understanding and would like you to send the script to [facility] in [place].

## 2021-05-23 LAB — HLA B27: NEGATIVE

## 2021-05-25 ASSESSMENT — ENCOUNTER SYMPTOMS
EYE REDNESS: 0
RHINORRHEA: 0
ABDOMINAL PAIN: 0
BLOOD IN STOOL: 0
EYE DISCHARGE: 0
CHEST TIGHTNESS: 0
SHORTNESS OF BREATH: 0
EYE PAIN: 0
COUGH: 0
PHOTOPHOBIA: 0
SORE THROAT: 0
TROUBLE SWALLOWING: 0
NAUSEA: 1
DIARRHEA: 0
EYE ITCHING: 0
SINUS PAIN: 0

## 2021-08-06 ENCOUNTER — OFFICE VISIT (OUTPATIENT)
Dept: FAMILY MEDICINE CLINIC | Age: 31
End: 2021-08-06
Payer: COMMERCIAL

## 2021-08-06 VITALS
SYSTOLIC BLOOD PRESSURE: 138 MMHG | OXYGEN SATURATION: 98 % | WEIGHT: 260 LBS | HEART RATE: 100 BPM | DIASTOLIC BLOOD PRESSURE: 82 MMHG

## 2021-08-06 DIAGNOSIS — R70.0 ELEVATED ERYTHROCYTE SEDIMENTATION RATE: Primary | ICD-10-CM

## 2021-08-06 DIAGNOSIS — R20.0 NUMBNESS OF RIGHT ANTERIOR THIGH: ICD-10-CM

## 2021-08-06 DIAGNOSIS — M47.817 SPONDYLOSIS OF LUMBOSACRAL REGION WITHOUT MYELOPATHY OR RADICULOPATHY: ICD-10-CM

## 2021-08-06 DIAGNOSIS — R51.9 ACUTE INTRACTABLE HEADACHE, UNSPECIFIED HEADACHE TYPE: ICD-10-CM

## 2021-08-06 DIAGNOSIS — M25.559 HIP PAIN: ICD-10-CM

## 2021-08-06 DIAGNOSIS — M25.50 POLYARTHRALGIA: ICD-10-CM

## 2021-08-06 DIAGNOSIS — M53.3 SACROILIAC JOINT DYSFUNCTION OF RIGHT SIDE: ICD-10-CM

## 2021-08-06 DIAGNOSIS — L65.9 HAIR LOSS: ICD-10-CM

## 2021-08-06 DIAGNOSIS — M62.830 SPASM OF MUSCLE OF LOWER BACK: ICD-10-CM

## 2021-08-06 DIAGNOSIS — M46.1 SACROILIITIS (HCC): ICD-10-CM

## 2021-08-06 DIAGNOSIS — L29.9 GENERALIZED PRURITUS: ICD-10-CM

## 2021-08-06 DIAGNOSIS — G89.29 CHRONIC LOW BACK PAIN WITHOUT SCIATICA, UNSPECIFIED BACK PAIN LATERALITY: ICD-10-CM

## 2021-08-06 DIAGNOSIS — M25.60 STIFFNESS IN JOINT: ICD-10-CM

## 2021-08-06 DIAGNOSIS — R10.2 PELVIC PAIN: ICD-10-CM

## 2021-08-06 DIAGNOSIS — R79.82 ELEVATED C-REACTIVE PROTEIN (CRP): ICD-10-CM

## 2021-08-06 DIAGNOSIS — M54.50 CHRONIC LOW BACK PAIN WITHOUT SCIATICA, UNSPECIFIED BACK PAIN LATERALITY: ICD-10-CM

## 2021-08-06 PROCEDURE — 99214 OFFICE O/P EST MOD 30 MIN: CPT | Performed by: INTERNAL MEDICINE

## 2021-08-06 RX ORDER — BUDESONIDE AND FORMOTEROL FUMARATE DIHYDRATE 160; 4.5 UG/1; UG/1
2 AEROSOL RESPIRATORY (INHALATION) DAILY
Qty: 1 INHALER | Refills: 3 | Status: SHIPPED | OUTPATIENT
Start: 2021-08-06 | End: 2021-11-02 | Stop reason: SDUPTHER

## 2021-08-06 RX ORDER — ETODOLAC 400 MG/1
400 TABLET, FILM COATED ORAL
COMMUNITY
Start: 2021-07-20 | End: 2022-01-21

## 2021-08-06 ASSESSMENT — PATIENT HEALTH QUESTIONNAIRE - PHQ9
2. FEELING DOWN, DEPRESSED OR HOPELESS: 2
SUM OF ALL RESPONSES TO PHQ QUESTIONS 1-9: 2
1. LITTLE INTEREST OR PLEASURE IN DOING THINGS: 0
SUM OF ALL RESPONSES TO PHQ9 QUESTIONS 1 & 2: 2
SUM OF ALL RESPONSES TO PHQ QUESTIONS 1-9: 2
SUM OF ALL RESPONSES TO PHQ QUESTIONS 1-9: 2

## 2021-08-11 ASSESSMENT — ENCOUNTER SYMPTOMS
BLOOD IN STOOL: 0
SORE THROAT: 0
TROUBLE SWALLOWING: 0
COUGH: 0
BACK PAIN: 1
DIARRHEA: 0
SINUS PAIN: 0
SHORTNESS OF BREATH: 0
RHINORRHEA: 0
CHEST TIGHTNESS: 0
ABDOMINAL PAIN: 0

## 2021-08-19 ENCOUNTER — HOSPITAL ENCOUNTER (OUTPATIENT)
Dept: MRI IMAGING | Age: 31
Discharge: HOME | End: 2021-08-21
Payer: COMMERCIAL

## 2021-08-19 ENCOUNTER — TELEPHONE (OUTPATIENT)
Dept: FAMILY MEDICINE CLINIC | Age: 31
End: 2021-08-19

## 2021-08-19 DIAGNOSIS — M25.60 STIFFNESS IN JOINT: ICD-10-CM

## 2021-08-19 DIAGNOSIS — M47.817 SPONDYLOSIS OF LUMBOSACRAL REGION WITHOUT MYELOPATHY OR RADICULOPATHY: ICD-10-CM

## 2021-08-19 DIAGNOSIS — M25.50 POLYARTHRALGIA: ICD-10-CM

## 2021-08-19 DIAGNOSIS — R70.0 ELEVATED ERYTHROCYTE SEDIMENTATION RATE: ICD-10-CM

## 2021-08-19 DIAGNOSIS — G89.29 CHRONIC LOW BACK PAIN WITHOUT SCIATICA, UNSPECIFIED BACK PAIN LATERALITY: ICD-10-CM

## 2021-08-19 DIAGNOSIS — L65.9 HAIR LOSS: ICD-10-CM

## 2021-08-19 DIAGNOSIS — M62.830 SPASM OF MUSCLE OF LOWER BACK: ICD-10-CM

## 2021-08-19 DIAGNOSIS — M25.559 HIP PAIN: ICD-10-CM

## 2021-08-19 DIAGNOSIS — R10.2 PELVIC PAIN: ICD-10-CM

## 2021-08-19 DIAGNOSIS — M54.50 CHRONIC LOW BACK PAIN WITHOUT SCIATICA, UNSPECIFIED BACK PAIN LATERALITY: ICD-10-CM

## 2021-08-19 DIAGNOSIS — L29.9 GENERALIZED PRURITUS: ICD-10-CM

## 2021-08-19 DIAGNOSIS — R51.9 ACUTE INTRACTABLE HEADACHE, UNSPECIFIED HEADACHE TYPE: ICD-10-CM

## 2021-08-19 DIAGNOSIS — R20.0 NUMBNESS OF RIGHT ANTERIOR THIGH: ICD-10-CM

## 2021-08-19 DIAGNOSIS — M46.1 SACROILIITIS (HCC): ICD-10-CM

## 2021-08-19 DIAGNOSIS — M53.3 SACROILIAC JOINT DYSFUNCTION OF RIGHT SIDE: ICD-10-CM

## 2021-08-19 DIAGNOSIS — R79.82 ELEVATED C-REACTIVE PROTEIN (CRP): ICD-10-CM

## 2021-08-19 PROCEDURE — 6360000004 HC RX CONTRAST MEDICATION: Performed by: INTERNAL MEDICINE

## 2021-08-19 PROCEDURE — 70553 MRI BRAIN STEM W/O & W/DYE: CPT

## 2021-08-19 PROCEDURE — A9579 GAD-BASE MR CONTRAST NOS,1ML: HCPCS | Performed by: INTERNAL MEDICINE

## 2021-08-19 PROCEDURE — 72156 MRI NECK SPINE W/O & W/DYE: CPT

## 2021-08-19 RX ADMIN — GADOTERIDOL 20 ML: 279.3 INJECTION, SOLUTION INTRAVENOUS at 10:53

## 2021-11-02 RX ORDER — BUDESONIDE AND FORMOTEROL FUMARATE DIHYDRATE 160; 4.5 UG/1; UG/1
2 AEROSOL RESPIRATORY (INHALATION) DAILY
Qty: 3 EACH | Refills: 3 | Status: SHIPPED | OUTPATIENT
Start: 2021-11-02 | End: 2021-11-10 | Stop reason: SDUPTHER

## 2021-11-02 NOTE — TELEPHONE ENCOUNTER
Jessica Alba is requesting a refill on the following medication(s):  Requested Prescriptions     Pending Prescriptions Disp Refills    SYMBICORT 160-4.5 MCG/ACT AERO 3 each 3     Sig: Inhale 2 puffs into the lungs daily       Last Visit Date (If Applicable):  6/8/1531    Next Visit Date:    Visit date not found

## 2021-11-10 RX ORDER — BUDESONIDE AND FORMOTEROL FUMARATE DIHYDRATE 160; 4.5 UG/1; UG/1
2 AEROSOL RESPIRATORY (INHALATION) DAILY
Qty: 3 EACH | Refills: 3 | Status: SHIPPED | OUTPATIENT
Start: 2021-11-10 | End: 2021-11-16 | Stop reason: SDUPTHER

## 2021-11-10 NOTE — TELEPHONE ENCOUNTER
Medication was sent to the wrong pharmacy.        [facility] is requesting a refill on the following medication(s):  Requested Prescriptions     Pending Prescriptions Disp Refills    SYMBICORT 160-4.5 MCG/ACT AERO 3 each 3     Sig: Inhale 2 puffs into the lungs daily       Last Visit Date (If Applicable):  3/0/5815    Next Visit Date:    Visit date not found

## 2021-11-16 ENCOUNTER — TELEPHONE (OUTPATIENT)
Dept: FAMILY MEDICINE CLINIC | Age: 31
End: 2021-11-16

## 2021-11-16 DIAGNOSIS — J45.909 MILD ASTHMA WITHOUT COMPLICATION, UNSPECIFIED WHETHER PERSISTENT: Primary | ICD-10-CM

## 2021-11-16 RX ORDER — BUDESONIDE AND FORMOTEROL FUMARATE DIHYDRATE 160; 4.5 UG/1; UG/1
2 AEROSOL RESPIRATORY (INHALATION) DAILY
Qty: 3 EACH | Refills: 3 | Status: CANCELLED | OUTPATIENT
Start: 2021-11-16

## 2021-11-16 RX ORDER — BUDESONIDE AND FORMOTEROL FUMARATE DIHYDRATE 160; 4.5 UG/1; UG/1
2 AEROSOL RESPIRATORY (INHALATION) DAILY
Qty: 3 EACH | Refills: 3 | Status: SHIPPED | OUTPATIENT
Start: 2021-11-16 | End: 2021-12-16 | Stop reason: SDUPTHER

## 2021-12-15 DIAGNOSIS — J45.909 MILD ASTHMA WITHOUT COMPLICATION, UNSPECIFIED WHETHER PERSISTENT: ICD-10-CM

## 2021-12-15 NOTE — TELEPHONE ENCOUNTER
patient was wondering if you could change the Symbicort Script to 3 months instead of 1 month because they charged her the same.

## 2021-12-15 NOTE — TELEPHONE ENCOUNTER
No other issues she just needs a new script for a 3 month supply for insurance purposes and its just as cheap.

## 2021-12-16 RX ORDER — BUDESONIDE AND FORMOTEROL FUMARATE DIHYDRATE 160; 4.5 UG/1; UG/1
2 AEROSOL RESPIRATORY (INHALATION) DAILY
Qty: 3 EACH | Refills: 3 | Status: SHIPPED | OUTPATIENT
Start: 2021-12-16

## 2021-12-21 NOTE — TELEPHONE ENCOUNTER
Spoke with [facility], they said 1 inhaler lasts 60 days as was written, so that's how she was charged. Pt needs to contact member services to see what is covered.

## 2022-01-21 ENCOUNTER — OFFICE VISIT (OUTPATIENT)
Dept: FAMILY MEDICINE CLINIC | Age: 32
End: 2022-01-21
Payer: COMMERCIAL

## 2022-01-21 VITALS
HEART RATE: 99 BPM | WEIGHT: 261 LBS | RESPIRATION RATE: 20 BRPM | DIASTOLIC BLOOD PRESSURE: 80 MMHG | SYSTOLIC BLOOD PRESSURE: 132 MMHG | BODY MASS INDEX: 46.25 KG/M2 | HEIGHT: 63 IN | TEMPERATURE: 97.3 F | OXYGEN SATURATION: 100 %

## 2022-01-21 DIAGNOSIS — G47.00 INSOMNIA, UNSPECIFIED TYPE: Primary | ICD-10-CM

## 2022-01-21 DIAGNOSIS — J45.20 INTERMITTENT ASTHMA, UNSPECIFIED ASTHMA SEVERITY, UNSPECIFIED WHETHER COMPLICATED: ICD-10-CM

## 2022-01-21 PROCEDURE — 99214 OFFICE O/P EST MOD 30 MIN: CPT | Performed by: INTERNAL MEDICINE

## 2022-01-21 RX ORDER — ALBUTEROL SULFATE 90 UG/1
2 AEROSOL, METERED RESPIRATORY (INHALATION) 4 TIMES DAILY PRN
COMMUNITY
End: 2022-01-21 | Stop reason: SDUPTHER

## 2022-01-21 RX ORDER — ALBUTEROL SULFATE 90 UG/1
2 AEROSOL, METERED RESPIRATORY (INHALATION) 4 TIMES DAILY PRN
Qty: 18 G | Refills: 5 | Status: SHIPPED | OUTPATIENT
Start: 2022-01-21 | End: 2022-02-18 | Stop reason: SDUPTHER

## 2022-01-21 RX ORDER — ZOLPIDEM TARTRATE 10 MG/1
10 TABLET ORAL NIGHTLY PRN
Qty: 30 TABLET | Refills: 0 | Status: SHIPPED | OUTPATIENT
Start: 2022-01-21 | End: 2022-02-20

## 2022-01-21 SDOH — ECONOMIC STABILITY: FOOD INSECURITY: WITHIN THE PAST 12 MONTHS, THE FOOD YOU BOUGHT JUST DIDN'T LAST AND YOU DIDN'T HAVE MONEY TO GET MORE.: NEVER TRUE

## 2022-01-21 SDOH — ECONOMIC STABILITY: FOOD INSECURITY: WITHIN THE PAST 12 MONTHS, YOU WORRIED THAT YOUR FOOD WOULD RUN OUT BEFORE YOU GOT MONEY TO BUY MORE.: NEVER TRUE

## 2022-01-21 ASSESSMENT — ENCOUNTER SYMPTOMS
BLOOD IN STOOL: 0
COUGH: 1
DIARRHEA: 0
SINUS PAIN: 0
SHORTNESS OF BREATH: 0
RHINORRHEA: 0
SORE THROAT: 0
TROUBLE SWALLOWING: 0
ABDOMINAL PAIN: 0
CHEST TIGHTNESS: 0

## 2022-01-21 ASSESSMENT — PATIENT HEALTH QUESTIONNAIRE - PHQ9
SUM OF ALL RESPONSES TO PHQ9 QUESTIONS 1 & 2: 1
1. LITTLE INTEREST OR PLEASURE IN DOING THINGS: 0
SUM OF ALL RESPONSES TO PHQ QUESTIONS 1-9: 1
2. FEELING DOWN, DEPRESSED OR HOPELESS: 1

## 2022-01-21 ASSESSMENT — SOCIAL DETERMINANTS OF HEALTH (SDOH): HOW HARD IS IT FOR YOU TO PAY FOR THE VERY BASICS LIKE FOOD, HOUSING, MEDICAL CARE, AND HEATING?: NOT VERY HARD

## 2022-01-21 NOTE — PROGRESS NOTES
[address]  [address] [address] [place] [address]  Dept: [phone]  Dept Fax: [phone]  Loc: [phone]     Visit Date:  1/21/2022    Patient:  Rodolfo Spivey  YOB: 1990    HPI:   Rodolfo Spivey presents today for   Chief Complaint   Patient presents with    Asthma     Breathing and sleep issues. She has had trouble falling and staying asleep last 2 weeks. S/p Covid- chest still feels raw and she has coughng fits at times. Ulisesken Fabian HPI 36year old female is coming in with worsening sleeping issues. Hard time falling asleep and then up in the next hour. Problems with initiation and maintenance of sleep. Its worse since she had COVID in /around last week of December 30th. No narcolepsy and or ARTHUR like symptoms. Sometimes would cough when she would laugh and those coughing fits would turn into asthma like symptoms. Needs albuterol inhaler. No worsening symptoms of post COVID or lingering symptoms she reports except her sleeping issues. Now she was told in the ER that her BP /HR was high . In the ER BP 150s/90s and HR 130s and today /80 with HR 99. She reports she always run high in terms of HR around 90s. Medications  Prior to Visit Medications    Medication Sig Taking? Authorizing Provider   SYMBICORT 160-4.5 MCG/ACT AERO Inhale 2 puffs into the lungs daily Yes Prateek Calloway MD   DULoxetine (CYMBALTA) 20 MG extended release capsule Take 40 mg by mouth daily  Yes Historical Provider, MD   albuterol sulfate  (90 Base) MCG/ACT inhaler Inhale 2 puffs into the lungs 4 times daily as needed  Historical Provider, MD        Allergies:  is allergic to amoxil [amoxicillin], pcn [penicillins], amoxicillin, and penicillin g. Past Medical History:   has a past medical history of YANIRA positive, Anxiety, Asthma, Depression, Headache, and Obesity.     Past Surgical History   has a past surgical history that includes [place] tooth extraction; Cholecystectomy; Gallbladder surgery (2017); and [place] tooth extraction (2011). Family History  family history includes Breast Cancer in her maternal grandmother; Cancer in her maternal grandfather; Depression in her mother; Obesity in her father. Social History   reports that she has never smoked. She has never used smokeless tobacco. She reports previous alcohol use. She reports that she does not use drugs. Health Maintenance:    Health Maintenance   Topic Date Due    Hepatitis C screen  Never done    Varicella vaccine (1 of 2 - 2-dose childhood series) Never done    Pneumococcal 0-64 years Vaccine (1 of 2 - PPSV23) Never done    HIV screen  Never done    DTaP/Tdap/Td vaccine (1 - Tdap) Never done    Pap smear  Never done    Flu vaccine (1) Never done    COVID-19 Vaccine (3 - Booster for Moderna series) 11/03/2021    Depression Monitoring  08/06/2022    Hepatitis A vaccine  Aged Out    Hepatitis B vaccine  Aged Out    Hib vaccine  Aged Out    Meningococcal (ACWY) vaccine  Aged Out       Subjective:      Review of Systems   Constitutional: Negative for fatigue, fever and unexpected weight change. HENT: Negative for ear pain, postnasal drip, rhinorrhea, sinus pain, sore throat and trouble swallowing. Eyes: Negative for visual disturbance. Respiratory: Positive for cough. Negative for chest tightness and shortness of breath. Cardiovascular: Negative for chest pain and leg swelling. Gastrointestinal: Negative for abdominal pain, blood in stool and diarrhea. Endocrine: Negative for polyuria. Genitourinary: Negative for difficulty urinating and flank pain. Musculoskeletal: Negative for arthralgias, joint swelling and myalgias. Skin: Negative for rash. Allergic/Immunologic: Negative for environmental allergies. Neurological: Negative for weakness, light-headedness, numbness and headaches.    Hematological: Negative for adenopathy. Psychiatric/Behavioral: Positive for sleep disturbance. Negative for behavioral problems and suicidal ideas. The patient is not nervous/anxious. Objective:     /80 (Site: Right Upper Arm, Position: Sitting, Cuff Size: Large Adult)   Pulse 99   Temp 97.3 °F (36.3 °C)   Resp 20   Ht 5' 3.1\" (1.603 m)   Wt 261 lb (118.4 kg)   SpO2 100%   BMI 46.09 kg/m²     Physical Exam  Vitals and nursing note reviewed. HENT:      Head: Normocephalic and atraumatic. Cardiovascular:      Rate and Rhythm: Regular rhythm. Tachycardia present. Pulses: Normal pulses. Heart sounds: Normal heart sounds. No murmur heard. No friction rub. No gallop. Comments:  regular rythm  Pulmonary:      Effort: Pulmonary effort is normal. No respiratory distress. Breath sounds: Normal breath sounds. No stridor. No wheezing or rales. Abdominal:      General: Abdomen is flat. Musculoskeletal:         General: Normal range of motion. Right lower leg: No edema. Left lower leg: No edema. Skin:     General: Skin is warm. Neurological:      Mental Status: She is alert and oriented to person, place, and time. Mental status is at baseline. Psychiatric:         Mood and Affect: Mood normal.             Assessment       Diagnosis Orders   1. Insomnia, unspecified type  zolpidem (AMBIEN) 10 MG tablet   2. Intermittent asthma, unspecified asthma severity, unspecified whether complicated  albuterol sulfate  (90 Base) MCG/ACT inhaler         PLAN   Home BP/HR monitoring and follow up. Ambien RX placed/educated on good sleep hygiene habits. No orders of the defined types were placed in this encounter. No follow-ups on file. Patient given educational materials - see patient instructions. Discussed use, benefit, and side effects of prescribed medications. All patient questions answered. Pt voiced understanding. Reviewed health maintenance.        Electronically

## 2022-02-18 DIAGNOSIS — J45.20 INTERMITTENT ASTHMA, UNSPECIFIED ASTHMA SEVERITY, UNSPECIFIED WHETHER COMPLICATED: ICD-10-CM

## 2022-02-18 RX ORDER — ALBUTEROL SULFATE 90 UG/1
2 AEROSOL, METERED RESPIRATORY (INHALATION) 4 TIMES DAILY PRN
Qty: 18 G | Refills: 5 | Status: SHIPPED | OUTPATIENT
Start: 2022-02-18

## 2022-02-18 NOTE — TELEPHONE ENCOUNTER
Valeri Egan is requesting a refill on the following medication(s):  Requested Prescriptions     Pending Prescriptions Disp Refills    albuterol sulfate  (90 Base) MCG/ACT inhaler 18 g 5     Sig: Inhale 2 puffs into the lungs 4 times daily as needed for Wheezing or Shortness of Breath       Last Visit Date (If Applicable):  Visit date not found    Next Visit Date:    Visit date not found

## 2022-10-17 ENCOUNTER — HOSPITAL ENCOUNTER (OUTPATIENT)
Dept: PREADMISSION TESTING | Age: 32
Discharge: HOME | End: 2022-10-21
Payer: COMMERCIAL

## 2022-10-17 VITALS
HEIGHT: 63 IN | BODY MASS INDEX: 46.07 KG/M2 | DIASTOLIC BLOOD PRESSURE: 110 MMHG | RESPIRATION RATE: 16 BRPM | OXYGEN SATURATION: 95 % | TEMPERATURE: 97.8 F | HEART RATE: 88 BPM | SYSTOLIC BLOOD PRESSURE: 157 MMHG | WEIGHT: 260 LBS

## 2022-10-17 LAB
ABSOLUTE EOS #: 0.39 K/UL (ref 0–0.44)
ABSOLUTE IMMATURE GRANULOCYTE: 0.05 K/UL (ref 0–0.3)
ABSOLUTE LYMPH #: 3.4 K/UL (ref 1.1–3.7)
ABSOLUTE MONO #: 0.75 K/UL (ref 0.1–1.2)
ANION GAP SERPL CALCULATED.3IONS-SCNC: 13 MMOL/L (ref 9–17)
BACTERIA: ABNORMAL
BASOPHILS # BLD: 1 % (ref 0–2)
BASOPHILS ABSOLUTE: 0.09 K/UL (ref 0–0.2)
BILIRUBIN URINE: NEGATIVE
BUN BLDV-MCNC: 9 MG/DL (ref 6–20)
BUN/CREAT BLD: 14 (ref 9–20)
CALCIUM SERPL-MCNC: 9.4 MG/DL (ref 8.6–10.4)
CHLORIDE BLD-SCNC: 103 MMOL/L (ref 98–107)
CO2: 22 MMOL/L (ref 20–31)
COLOR: YELLOW
CREAT SERPL-MCNC: 0.65 MG/DL (ref 0.5–0.9)
EOSINOPHILS RELATIVE PERCENT: 3 % (ref 1–4)
EPITHELIAL CELLS UA: ABNORMAL /HPF (ref 0–5)
GFR SERPL CREATININE-BSD FRML MDRD: >60 ML/MIN/1.73M2
GLUCOSE BLD-MCNC: 108 MG/DL (ref 70–99)
GLUCOSE URINE: NEGATIVE
HCT VFR BLD CALC: 40.8 % (ref 36.3–47.1)
HEMOGLOBIN: 13.3 G/DL (ref 11.9–15.1)
IMMATURE GRANULOCYTES: 0 %
INR BLD: 0.9
KETONES, URINE: NEGATIVE
LEUKOCYTE ESTERASE, URINE: NEGATIVE
LYMPHOCYTES # BLD: 29 % (ref 24–43)
MCH RBC QN AUTO: 27.2 PG (ref 25.2–33.5)
MCHC RBC AUTO-ENTMCNC: 32.6 G/DL (ref 28.4–34.8)
MCV RBC AUTO: 83.4 FL (ref 82.6–102.9)
MONOCYTES # BLD: 6 % (ref 3–12)
MUCUS: ABNORMAL
NITRITE, URINE: NEGATIVE
NRBC AUTOMATED: 0 PER 100 WBC
PARTIAL THROMBOPLASTIN TIME: 27.9 SEC (ref 23.9–33.8)
PDW BLD-RTO: 14 % (ref 11.8–14.4)
PH UA: 6 (ref 5–8)
PLATELET # BLD: 387 K/UL (ref 138–453)
PMV BLD AUTO: 9.3 FL (ref 8.1–13.5)
POTASSIUM SERPL-SCNC: 4.1 MMOL/L (ref 3.7–5.3)
PROTEIN UA: NEGATIVE
PROTHROMBIN TIME: 12 SEC (ref 11.5–14.2)
RBC # BLD: 4.89 M/UL (ref 3.95–5.11)
RBC UA: ABNORMAL /HPF (ref 0–2)
SEG NEUTROPHILS: 61 % (ref 36–65)
SEGMENTED NEUTROPHILS ABSOLUTE COUNT: 7.15 K/UL (ref 1.5–8.1)
SODIUM BLD-SCNC: 138 MMOL/L (ref 135–144)
SPECIFIC GRAVITY UA: 1.03 (ref 1–1.03)
TURBIDITY: ABNORMAL
URINE HGB: NEGATIVE
UROBILINOGEN, URINE: NORMAL
WBC # BLD: 11.8 K/UL (ref 3.5–11.3)
WBC UA: ABNORMAL /HPF (ref 0–5)

## 2022-10-17 PROCEDURE — 85610 PROTHROMBIN TIME: CPT

## 2022-10-17 PROCEDURE — 81001 URINALYSIS AUTO W/SCOPE: CPT

## 2022-10-17 PROCEDURE — 85025 COMPLETE CBC W/AUTO DIFF WBC: CPT

## 2022-10-17 PROCEDURE — 80048 BASIC METABOLIC PNL TOTAL CA: CPT

## 2022-10-17 PROCEDURE — 93005 ELECTROCARDIOGRAM TRACING: CPT | Performed by: ORTHOPAEDIC SURGERY

## 2022-10-17 PROCEDURE — 85730 THROMBOPLASTIN TIME PARTIAL: CPT

## 2022-10-17 PROCEDURE — 87086 URINE CULTURE/COLONY COUNT: CPT

## 2022-10-17 PROCEDURE — 36415 COLL VENOUS BLD VENIPUNCTURE: CPT

## 2022-10-17 PROCEDURE — 87641 MR-STAPH DNA AMP PROBE: CPT

## 2022-10-17 RX ORDER — CLINDAMYCIN PHOSPHATE 900 MG/50ML
900 INJECTION INTRAVENOUS ONCE
OUTPATIENT
Start: 2022-11-07

## 2022-10-17 ASSESSMENT — PAIN DESCRIPTION - LOCATION: LOCATION: LEG;BUTTOCKS

## 2022-10-17 ASSESSMENT — PAIN DESCRIPTION - DESCRIPTORS: DESCRIPTORS: ACHING

## 2022-10-17 ASSESSMENT — PAIN DESCRIPTION - FREQUENCY: FREQUENCY: CONTINUOUS

## 2022-10-17 ASSESSMENT — PAIN DESCRIPTION - PAIN TYPE: TYPE: CHRONIC PAIN

## 2022-10-17 ASSESSMENT — PAIN DESCRIPTION - ORIENTATION: ORIENTATION: RIGHT

## 2022-10-17 ASSESSMENT — PAIN SCALES - GENERAL: PAINLEVEL_OUTOF10: 2

## 2022-10-17 NOTE — PRE-PROCEDURE INSTRUCTIONS
[address] [facility] ON Monday, November 7  at 0545 AM    Once you enter the hospital lobby, take the elevators to the second floor. Check-In is at the surgery registration desk. Continue to take your home medications as you normally do up to and including the night before surgery with the exception of any blood thinning medications. Please stop any blood thinning medications as directed by your surgeon or prescribing physician. Failure to stop certain medications may interfere with your scheduled surgery. These may include:  Aspirin, Warfarin (Coumadin), Clopidogrel (Plavix), Ibuprofen (Motrin, Advil), Naproxen (Aleve), Meloxicam (Mobic), Celecoxib (Celebrex), Eliquis, Pradaxa, Xarelto, Effient, Fish Oil, Herbal supplements. Please take the following medication(s) the day of surgery with a small sip of water:  Cymbalta     Please use your inhaler(s) if needed and bring your inhaler(s) from home the day of surgery. PREPARING FOR YOUR SURGERY:     Before surgery, you can play an important role in your own health. Because skin is not sterile, we need to be sure that your skin is as free of germs as possible before surgery by carefully washing before surgery. Preparing or prepping skin before surgery can reduce the risk of a surgical site infection.   Do not shave the area of your body where your surgery will be performed unless you received specific permission from your physician. You will need to shower at home the night before surgery and the morning of surgery with a special soap called chlorhexidine gluconate (CHG*). *Not to be used by people allergic to Chlorhexidine Gluconate (CHG). Following these instructions will help you be sure that your skin is clean before surgery. Instructions on cleaning your skin before surgery: The night before your surgery: You will need to shower with warm water (not hot) and the CHG soap.       Use a clean wash cloth and a clean towel. Have clean clothes available to put on after the shower. First wash your hair with regular shampoo. Rinse your hair and body thoroughly to remove the shampoo. Wash your face and genital area (private parts) with your regular soap or water only. Thoroughly rinse your body with warm water from the neck down. Turn water off to prevent rinsing the soap off too soon. With a clean wet washcloth and half of the CHG soap in the bottle, lather your entire body from the neck down. Do not use CHG soap near your eyes or ears to avoid injury to those areas. Wash thoroughly, paying special attention to the area where your surgery will be performed. Wash your body gently for five (5) minutes. Avoid scrubbing your skin too hard. Turn the water back on and rinse your body thoroughly. Pat yourself dry with a clean, soft towel. Do not apply lotion, cream or powder. Dress with clean freshly washed clothes. The morning of surgery:    Repeat shower following steps above - using remaining half of CHG soap in bottle. Patient Instructions: If you are having any type of anesthesia you are to have nothing to eat or drink after midnight the night before your surgery. This includes gum, hard candy, mints, water or smoking or chewing tobacco.  The only exception to this is a small sip of water to take with any morning dose of heart, blood pressure, or seizure medications. No alcoholic beverages for 24 hours prior to surgery. Brush your teeth but do not swallow water. Bring your eye glasses and case with you. No contacts are to be worn the day of surgery. You also may bring your hearing aids. Most surgical procedures involving anesthesia will require that you remove your dentures prior to surgery. Do not wear any jewelry or body piercings day of surgery. Also, NO lotion, perfume or deodorant to be used the day of surgery.   No nail polish on the operative extremity (arm/leg surgeries)    Please wear loose, comfortable clothing. If you are potentially going to have a cast or brace bring clothing that will fit over them. In case of illness - If you have cold or flu like symptoms (high fever, runny nose, sore throat, cough, etc.) rash, nausea, vomiting, loose stools, and/or recent contact with someone who has a contagious disease (chicken pox, measles, etc.) Please call your doctor before coming to the hospital.         Day of Surgery/Procedure:    As a patient at [facility] you can expect quality medical and nursing care that is centered on your individual needs. Our goal is to make your surgical experience as comfortable as possible    . Transportation After Your Surgery/Procedure: You will need a friend or family member to drive you home after your procedure. Your  must be 25years of age or older and able to sign off on your discharge instructions. A taxi cab or any other form of public transportation is not acceptable. Your friend or family member must stay at the hospital throughout your procedure. Someone must remain with you for the first 24 hours after your surgery if you receive anesthesia or medication. If you do not have someone to stay with you, your procedure may be cancelled.       If you have any other questions regarding your procedure or the day of surgery, please call [phone]      _________________________  ____________________________  Signature (Patient)              Signature (Provider)               Date

## 2022-10-17 NOTE — H&P
History and Physical Service   [facility] - INPATIENT    HISTORY AND PHYSICAL EXAMINATION            Date of Evaluation: 10/17/2022  Patient name:  Balbina Soares  MRN:   [id number]  YOB: 1990  PCP:    GILL Novak CNP    History Obtained From:     Patient, medical records    History of Present Illness: This is Balbina Soares a 29 y.o. female who presents for a pre-admission testing appointment for an upcoming RIGHT SI JOINT FUSION PERCUTANEOUS   SI BONE by Sheldon Cooper MD scheduled on1 1/07/2022 at 0730.  due to RIGHT SI JOINT DYSFUNCTION. The patient's chief complaint is PAIN IN THE RIGHT BUTTOCK RADIATING TO THE RIGHT THIGH AT 6-9 /10 THE RIGHT LEG PAIN HAS CAUSED HER LEG TO GIVE OUT. .SHE  HAS FALLEN. THE  pain which has progressively worsened over the past 3 YEARS  PAIN IS LESS IN THE MORNING AND INCREASES DURING THE DAY. .  THE  pain is aggravated by ACTIVITY  and is minimally relieved with REST. . Prior treatment includes STEROID INJECTIONS. PAIN MEDICATIONS ARE NOT EFFECTIVE. SHE PRESENTS FOR SURGICAL CORRECTION. Sleep apnea questionnaire  1) Do you snore loudly? NO  2) Do you often feel tired, fatigued, or sleepy? NO  3) Has anyone observed you stop breathing or choking/gasping during your sleep? NO  4) Do you have hypertension? NO  5) BMI >35 kg/m2? YES   6) Age > 50? NO  7) Pt is a male? NO    Functional Capacity:  1) Pt IS able to walk 2 city blocks on level ground without SOB. 2) Pt IS  able to climb 2 flights of stairs without SOB. 3) Pt IS  able to walk up a hill for 1-2 city blocks without SOB.     Past Medical History:     Past Medical History:   Diagnosis Date    YANIRA positive     Anxiety     Asthma     Depression     Headache     Obesity         Past Surgical History:     Past Surgical History:   Procedure Laterality Date    CHOLECYSTECTOMY      GALLBLADDER SURGERY  2017    TUBAL LIGATION      WISDOM TOOTH EXTRACTION      WISDOM TOOTH EXTRACTION  2011 Medications Prior to Admission:     Prior to Admission medications    Medication Sig Start Date End Date Taking? Authorizing Provider   albuterol sulfate  (90 Base) MCG/ACT inhaler Inhale 2 puffs into the lungs 4 times daily as needed for Wheezing or Shortness of Breath 2/18/22   Man Angela MD   SYMBICORT 160-4.5 MCG/ACT AERO Inhale 2 puffs into the lungs daily 12/16/21   Man Angela MD   DULoxetine (CYMBALTA) 20 MG extended release capsule Take 40 mg by mouth daily  10/7/20   Historical Provider, MD        Allergies:     Amoxil [amoxicillin], Pcn [penicillins], Amoxicillin, and Penicillin g    Social History:     Tobacco:    reports that she has never smoked. She has never used smokeless tobacco.  Alcohol:      reports that she does not currently use alcohol. Drug Use:  reports no history of drug use. Family History:     Family History   Problem Relation Age of Onset    Depression Mother     Obesity Father     Breast Cancer Maternal Grandmother     Cancer Maternal Grandfather        Review of Systems:     Positive and Negative as described in HPI. CONSTITUTIONAL:  Negative for fevers, chills, sweats, fatigue, and weight loss. HEENT: WEARS GLASSES NO  hearing changes, rhinorrhea, and throat pain. RESPIRATORY: HAS ASTHMA USES DAILY INHALERS HAS RARE shortness of breath, cough, congestion, and wheezing. CARDIOVASCULAR:  Negative for chest pain, blood clot, irregular heartbeat, and palpitations. GASTROINTESTINAL:  Negative for reflux, nausea, vomiting, diarrhea, constipation, change in bowel habits, and abdominal pain. GENITOURINARY:  Negative for difficulty of urination, burning with urination, and frequency. INTEGUMENT:  Negative for rash, skin lesions, and easy bruising. Instructed pt to call   as soon as possible if a rash or wound develops prior to surgery. Pt voiced understanding. HEMATOLOGIC/LYMPHATIC:  Negative for swelling/edema.    ALLERGIC/IMMUNOLOGIC:  Negative for urticaria and itching. ENDOCRINE:  Negative for increase in thirst, increase in urination, and heat or cold intolerance. MUSCULOSKELETAL: See HPI. NEUROLOGICAL:  Negative for headaches, dizziness, lightheadedness, numbness, and tingling extremities. BEHAVIOR/PSYCH: IS BEING TREATED FOR  depression and anxiety. Physical Exam:   BP (!) 157/110   Pulse 88   Temp 97.8 °F (36.6 °C) (Temporal)   Resp 16   Ht 5' 3\" (1.6 m)   Wt 260 lb (117.9 kg)   SpO2 95%   BMI 46.06 kg/m²   No LMP recorded. No obstetric history on file. No results for input(s): POCGLU in the last 72 hours. General Appearance:  Alert, well appearing, and in no acute distress. Mental status:  Oriented to person, place, and time. Head:  Normocephalic and atraumatic. Eye:  No icterus, redness, pupils equal and reactive, extraocular eye movements intact, and conjunctiva clear. Ear:  Hearing grossly intact. Nose:  No drainage noted. Mouth:  Mucous membranes moist.  Neck: Supple and no carotid bruits noted. Lungs:  Bilateral equal air entry, clear to auscultation, no wheezing, rales or rhonchi, and normal effort. Cardiovascular:  Normal rate, regular rhythm, no murmur, gallop, or rub. Abdomen:  Soft, non-tender, non-distended, and active bowel sounds. Neurologic:  Normal speech and cranial nerves II through XII grossly intact. Strength 5/5 bilaterally. Skin:  No gross lesions, rashes, bruising, or bleeding on exposed skin area. Extremities:  Posterior tibial pulses 2+ bilaterally. No pedal edema. No calf tenderness with palpation. Psych:  Normal affect.      Investigations:      Laboratory Testing:  Recent Results (from the past 24 hour(s))   EKG 12 Lead    Collection Time: 10/17/22  7:29 AM   Result Value Ref Range    Ventricular Rate 77 BPM    Atrial Rate 77 BPM    P-R Interval 146 ms    QRS Duration 92 ms    Q-T Interval 380 ms    QTc Calculation (Bazett) 430 ms    P Axis 40 degrees    R Axis 35 degrees    T Axis 29 degrees CBC with Auto Differential    Collection Time: 10/17/22  7:41 AM   Result Value Ref Range    WBC 11.8 (H) 3.5 - 11.3 k/uL    RBC 4.89 3.95 - 5.11 m/uL    Hemoglobin 13.3 11.9 - 15.1 g/dL    Hematocrit 40.8 36.3 - 47.1 %    MCV 83.4 82.6 - 102.9 fL    MCH 27.2 25.2 - 33.5 pg    MCHC 32.6 28.4 - 34.8 g/dL    RDW 14.0 11.8 - 14.4 %    Platelets 127 010 - 893 k/uL    MPV 9.3 8.1 - 13.5 fL    NRBC Automated 0.0 0.0 per 100 WBC    Seg Neutrophils 61 36 - 65 %    Lymphocytes 29 24 - 43 %    Monocytes 6 3 - 12 %    Eosinophils % 3 1 - 4 %    Basophils 1 0 - 2 %    Immature Granulocytes 0 0 %    Segs Absolute 7.15 1.50 - 8.10 k/uL    Absolute Lymph # 3.40 1.10 - 3.70 k/uL    Absolute Mono # 0.75 0.10 - 1.20 k/uL    Absolute Eos # 0.39 0.00 - 0.44 k/uL    Basophils Absolute 0.09 0.00 - 0.20 k/uL    Absolute Immature Granulocyte 0.05 0.00 - 0.30 k/uL       Recent Labs     10/17/22  0741   HGB 13.3   HCT 40.8   WBC 11.8*   MCV 83.4       No results for input(s): COVID19 in the last 720 hours. *Please note that labs listed above are the most recent lab values available in EPIC at the time of the visit and additional labs may have been drawn or resulted since that time. Imaging/Diagnostics:    SEE Epic   EKG: OF 10/17/2022 ON THE SHORT CHART See Epic. Diagnosis:      1. RIGHT SI JOINT DYSFUNCTION. Plans:     1.  RIGHT SI JOINT FUSION PERCUTANEOUS   SI BONE      Nanci Oneill, GILL - CNP  10/17/2022  8:09 AM

## 2022-10-18 ENCOUNTER — TELEPHONE (OUTPATIENT)
Dept: FAMILY MEDICINE CLINIC | Age: 32
End: 2022-10-18

## 2022-10-18 LAB
CULTURE: NORMAL
EKG ATRIAL RATE: 77 BPM
EKG P AXIS: 40 DEGREES
EKG P-R INTERVAL: 146 MS
EKG Q-T INTERVAL: 380 MS
EKG QRS DURATION: 92 MS
EKG QTC CALCULATION (BAZETT): 430 MS
EKG R AXIS: 35 DEGREES
EKG T AXIS: 29 DEGREES
EKG VENTRICULAR RATE: 77 BPM
MRSA, DNA, NASAL: NEGATIVE
SPECIMEN DESCRIPTION: NORMAL
SPECIMEN DESCRIPTION: NORMAL

## 2022-11-07 ENCOUNTER — ANESTHESIA (OUTPATIENT)
Dept: OPERATING ROOM | Age: 32
End: 2022-11-07
Payer: COMMERCIAL

## 2022-11-07 ENCOUNTER — APPOINTMENT (OUTPATIENT)
Dept: GENERAL RADIOLOGY | Age: 32
End: 2022-11-07
Attending: ORTHOPAEDIC SURGERY
Payer: COMMERCIAL

## 2022-11-07 ENCOUNTER — HOSPITAL ENCOUNTER (OUTPATIENT)
Age: 32
Setting detail: OUTPATIENT SURGERY
Discharge: HOME | End: 2022-11-07
Attending: ORTHOPAEDIC SURGERY | Admitting: ORTHOPAEDIC SURGERY
Payer: COMMERCIAL

## 2022-11-07 ENCOUNTER — ANESTHESIA EVENT (OUTPATIENT)
Dept: OPERATING ROOM | Age: 32
End: 2022-11-07
Payer: COMMERCIAL

## 2022-11-07 VITALS
OXYGEN SATURATION: 95 % | BODY MASS INDEX: 46.07 KG/M2 | HEIGHT: 63 IN | SYSTOLIC BLOOD PRESSURE: 141 MMHG | HEART RATE: 77 BPM | RESPIRATION RATE: 15 BRPM | WEIGHT: 260 LBS | TEMPERATURE: 97 F | DIASTOLIC BLOOD PRESSURE: 92 MMHG

## 2022-11-07 DIAGNOSIS — M53.3 SACROILIAC JOINT DYSFUNCTION OF RIGHT SIDE: Primary | ICD-10-CM

## 2022-11-07 LAB — HCG, PREGNANCY URINE (POC): NEGATIVE

## 2022-11-07 PROCEDURE — 7100000011 HC PHASE II RECOVERY - ADDTL 15 MIN: Performed by: ORTHOPAEDIC SURGERY

## 2022-11-07 PROCEDURE — C1713 ANCHOR/SCREW BN/BN,TIS/BN: HCPCS | Performed by: ORTHOPAEDIC SURGERY

## 2022-11-07 PROCEDURE — 3600000012 HC SURGERY LEVEL 2 ADDTL 15MIN: Performed by: ORTHOPAEDIC SURGERY

## 2022-11-07 PROCEDURE — 3700000000 HC ANESTHESIA ATTENDED CARE: Performed by: ORTHOPAEDIC SURGERY

## 2022-11-07 PROCEDURE — 2580000003 HC RX 258: Performed by: ANESTHESIOLOGY

## 2022-11-07 PROCEDURE — A4217 STERILE WATER/SALINE, 500 ML: HCPCS | Performed by: ORTHOPAEDIC SURGERY

## 2022-11-07 PROCEDURE — 6360000002 HC RX W HCPCS: Performed by: ANESTHESIOLOGY

## 2022-11-07 PROCEDURE — 3600000002 HC SURGERY LEVEL 2 BASE: Performed by: ORTHOPAEDIC SURGERY

## 2022-11-07 PROCEDURE — 7100000010 HC PHASE II RECOVERY - FIRST 15 MIN: Performed by: ORTHOPAEDIC SURGERY

## 2022-11-07 PROCEDURE — 81025 URINE PREGNANCY TEST: CPT

## 2022-11-07 PROCEDURE — 6360000002 HC RX W HCPCS: Performed by: SPECIALIST

## 2022-11-07 PROCEDURE — 2500000003 HC RX 250 WO HCPCS: Performed by: SPECIALIST

## 2022-11-07 PROCEDURE — 7100000000 HC PACU RECOVERY - FIRST 15 MIN: Performed by: ORTHOPAEDIC SURGERY

## 2022-11-07 PROCEDURE — 3209999900 FLUORO FOR SURGICAL PROCEDURES

## 2022-11-07 PROCEDURE — 6360000002 HC RX W HCPCS: Performed by: ORTHOPAEDIC SURGERY

## 2022-11-07 PROCEDURE — 2500000003 HC RX 250 WO HCPCS: Performed by: ORTHOPAEDIC SURGERY

## 2022-11-07 PROCEDURE — 3700000001 HC ADD 15 MINUTES (ANESTHESIA): Performed by: ORTHOPAEDIC SURGERY

## 2022-11-07 PROCEDURE — 2709999900 HC NON-CHARGEABLE SUPPLY: Performed by: ORTHOPAEDIC SURGERY

## 2022-11-07 PROCEDURE — 6370000000 HC RX 637 (ALT 250 FOR IP): Performed by: ORTHOPAEDIC SURGERY

## 2022-11-07 PROCEDURE — 2720000010 HC SURG SUPPLY STERILE: Performed by: ORTHOPAEDIC SURGERY

## 2022-11-07 PROCEDURE — 2580000003 HC RX 258: Performed by: ORTHOPAEDIC SURGERY

## 2022-11-07 PROCEDURE — 7100000001 HC PACU RECOVERY - ADDTL 15 MIN: Performed by: ORTHOPAEDIC SURGERY

## 2022-11-07 DEVICE — IMPLANTABLE DEVICE
Type: IMPLANTABLE DEVICE | Site: BACK | Status: FUNCTIONAL
Brand: IFUSE IMPLANT SYSTEM

## 2022-11-07 RX ORDER — METOPROLOL SUCCINATE 50 MG/1
50 TABLET, EXTENDED RELEASE ORAL DAILY
COMMUNITY

## 2022-11-07 RX ORDER — SODIUM CHLORIDE 0.9 % (FLUSH) 0.9 %
5-40 SYRINGE (ML) INJECTION PRN
Status: DISCONTINUED | OUTPATIENT
Start: 2022-11-07 | End: 2022-11-07 | Stop reason: HOSPADM

## 2022-11-07 RX ORDER — OXYCODONE HYDROCHLORIDE AND ACETAMINOPHEN 5; 325 MG/1; MG/1
1-2 TABLET ORAL EVERY 4 HOURS PRN
Qty: 60 TABLET | Refills: 0 | Status: SHIPPED | OUTPATIENT
Start: 2022-11-07 | End: 2022-11-14

## 2022-11-07 RX ORDER — ONDANSETRON 2 MG/ML
INJECTION INTRAMUSCULAR; INTRAVENOUS PRN
Status: DISCONTINUED | OUTPATIENT
Start: 2022-11-07 | End: 2022-11-07 | Stop reason: SDUPTHER

## 2022-11-07 RX ORDER — SODIUM CHLORIDE 9 MG/ML
INJECTION, SOLUTION INTRAVENOUS CONTINUOUS
Status: DISCONTINUED | OUTPATIENT
Start: 2022-11-07 | End: 2022-11-07

## 2022-11-07 RX ORDER — ROCURONIUM BROMIDE 10 MG/ML
INJECTION, SOLUTION INTRAVENOUS PRN
Status: DISCONTINUED | OUTPATIENT
Start: 2022-11-07 | End: 2022-11-07 | Stop reason: SDUPTHER

## 2022-11-07 RX ORDER — HYDROMORPHONE HYDROCHLORIDE 1 MG/ML
0.5 INJECTION, SOLUTION INTRAMUSCULAR; INTRAVENOUS; SUBCUTANEOUS EVERY 5 MIN PRN
Status: DISCONTINUED | OUTPATIENT
Start: 2022-11-07 | End: 2022-11-07 | Stop reason: HOSPADM

## 2022-11-07 RX ORDER — OXYCODONE HYDROCHLORIDE 5 MG/1
5 TABLET ORAL
Status: DISCONTINUED | OUTPATIENT
Start: 2022-11-07 | End: 2022-11-07 | Stop reason: HOSPADM

## 2022-11-07 RX ORDER — DEXAMETHASONE SODIUM PHOSPHATE 10 MG/ML
INJECTION, SOLUTION INTRAMUSCULAR; INTRAVENOUS PRN
Status: DISCONTINUED | OUTPATIENT
Start: 2022-11-07 | End: 2022-11-07 | Stop reason: SDUPTHER

## 2022-11-07 RX ORDER — PROPOFOL 10 MG/ML
INJECTION, EMULSION INTRAVENOUS PRN
Status: DISCONTINUED | OUTPATIENT
Start: 2022-11-07 | End: 2022-11-07 | Stop reason: SDUPTHER

## 2022-11-07 RX ORDER — FENTANYL CITRATE 50 UG/ML
25 INJECTION, SOLUTION INTRAMUSCULAR; INTRAVENOUS EVERY 5 MIN PRN
Status: DISCONTINUED | OUTPATIENT
Start: 2022-11-07 | End: 2022-11-07 | Stop reason: HOSPADM

## 2022-11-07 RX ORDER — SODIUM CHLORIDE 0.9 % (FLUSH) 0.9 %
5-40 SYRINGE (ML) INJECTION EVERY 12 HOURS SCHEDULED
Status: DISCONTINUED | OUTPATIENT
Start: 2022-11-07 | End: 2022-11-07 | Stop reason: HOSPADM

## 2022-11-07 RX ORDER — VANCOMYCIN HYDROCHLORIDE 1 G/20ML
INJECTION, POWDER, LYOPHILIZED, FOR SOLUTION INTRAVENOUS PRN
Status: DISCONTINUED | OUTPATIENT
Start: 2022-11-07 | End: 2022-11-07 | Stop reason: ALTCHOICE

## 2022-11-07 RX ORDER — LIDOCAINE HYDROCHLORIDE 20 MG/ML
INJECTION, SOLUTION EPIDURAL; INFILTRATION; INTRACAUDAL; PERINEURAL PRN
Status: DISCONTINUED | OUTPATIENT
Start: 2022-11-07 | End: 2022-11-07 | Stop reason: SDUPTHER

## 2022-11-07 RX ORDER — LIDOCAINE HYDROCHLORIDE 10 MG/ML
1 INJECTION, SOLUTION EPIDURAL; INFILTRATION; INTRACAUDAL; PERINEURAL
Status: DISCONTINUED | OUTPATIENT
Start: 2022-11-07 | End: 2022-11-07 | Stop reason: HOSPADM

## 2022-11-07 RX ORDER — SODIUM CHLORIDE 9 MG/ML
INJECTION, SOLUTION INTRAVENOUS PRN
Status: DISCONTINUED | OUTPATIENT
Start: 2022-11-07 | End: 2022-11-07 | Stop reason: HOSPADM

## 2022-11-07 RX ORDER — ONDANSETRON 2 MG/ML
4 INJECTION INTRAMUSCULAR; INTRAVENOUS
Status: DISCONTINUED | OUTPATIENT
Start: 2022-11-07 | End: 2022-11-07 | Stop reason: HOSPADM

## 2022-11-07 RX ORDER — SODIUM CHLORIDE, SODIUM LACTATE, POTASSIUM CHLORIDE, CALCIUM CHLORIDE 600; 310; 30; 20 MG/100ML; MG/100ML; MG/100ML; MG/100ML
INJECTION, SOLUTION INTRAVENOUS CONTINUOUS
Status: DISCONTINUED | OUTPATIENT
Start: 2022-11-07 | End: 2022-11-07 | Stop reason: HOSPADM

## 2022-11-07 RX ORDER — SODIUM CHLORIDE 9 MG/ML
INJECTION, SOLUTION INTRAVENOUS PRN
Status: DISCONTINUED | OUTPATIENT
Start: 2022-11-07 | End: 2022-11-07

## 2022-11-07 RX ORDER — MAGNESIUM CARB/ALUMINUM HYDROX 105-160MG
TABLET,CHEWABLE ORAL PRN
Status: DISCONTINUED | OUTPATIENT
Start: 2022-11-07 | End: 2022-11-07 | Stop reason: ALTCHOICE

## 2022-11-07 RX ORDER — BUPIVACAINE HYDROCHLORIDE AND EPINEPHRINE 5; 5 MG/ML; UG/ML
INJECTION, SOLUTION EPIDURAL; INTRACAUDAL; PERINEURAL PRN
Status: DISCONTINUED | OUTPATIENT
Start: 2022-11-07 | End: 2022-11-07 | Stop reason: ALTCHOICE

## 2022-11-07 RX ORDER — MIDAZOLAM HYDROCHLORIDE 1 MG/ML
INJECTION INTRAMUSCULAR; INTRAVENOUS PRN
Status: DISCONTINUED | OUTPATIENT
Start: 2022-11-07 | End: 2022-11-07 | Stop reason: SDUPTHER

## 2022-11-07 RX ORDER — CLINDAMYCIN PHOSPHATE 900 MG/50ML
900 INJECTION INTRAVENOUS ONCE
Status: COMPLETED | OUTPATIENT
Start: 2022-11-07 | End: 2022-11-07

## 2022-11-07 RX ORDER — FENTANYL CITRATE 50 UG/ML
INJECTION, SOLUTION INTRAMUSCULAR; INTRAVENOUS PRN
Status: DISCONTINUED | OUTPATIENT
Start: 2022-11-07 | End: 2022-11-07 | Stop reason: SDUPTHER

## 2022-11-07 RX ADMIN — LIDOCAINE HYDROCHLORIDE 100 MG: 20 INJECTION, SOLUTION EPIDURAL; INFILTRATION; INTRACAUDAL; PERINEURAL at 07:37

## 2022-11-07 RX ADMIN — SODIUM CHLORIDE, POTASSIUM CHLORIDE, SODIUM LACTATE AND CALCIUM CHLORIDE: 600; 310; 30; 20 INJECTION, SOLUTION INTRAVENOUS at 06:48

## 2022-11-07 RX ADMIN — Medication 50 MCG: at 07:37

## 2022-11-07 RX ADMIN — ONDANSETRON 4 MG: 2 INJECTION INTRAMUSCULAR; INTRAVENOUS at 08:22

## 2022-11-07 RX ADMIN — PROPOFOL 200 MG: 10 INJECTION, EMULSION INTRAVENOUS at 07:37

## 2022-11-07 RX ADMIN — FENTANYL CITRATE 25 MCG: 50 INJECTION INTRAMUSCULAR; INTRAVENOUS at 09:33

## 2022-11-07 RX ADMIN — CLINDAMYCIN IN 5 PERCENT DEXTROSE 900 MG: 18 INJECTION, SOLUTION INTRAVENOUS at 07:48

## 2022-11-07 RX ADMIN — SUGAMMADEX 200 MG: 100 INJECTION, SOLUTION INTRAVENOUS at 08:24

## 2022-11-07 RX ADMIN — Medication 50 MCG: at 08:04

## 2022-11-07 RX ADMIN — FENTANYL CITRATE 25 MCG: 50 INJECTION INTRAMUSCULAR; INTRAVENOUS at 09:20

## 2022-11-07 RX ADMIN — ROCURONIUM BROMIDE 40 MG: 10 INJECTION, SOLUTION INTRAVENOUS at 07:37

## 2022-11-07 RX ADMIN — FENTANYL CITRATE 25 MCG: 50 INJECTION INTRAMUSCULAR; INTRAVENOUS at 09:12

## 2022-11-07 RX ADMIN — DEXAMETHASONE SODIUM PHOSPHATE 10 MG: 10 INJECTION, SOLUTION INTRAMUSCULAR; INTRAVENOUS at 07:54

## 2022-11-07 RX ADMIN — MIDAZOLAM 2 MG: 1 INJECTION INTRAMUSCULAR; INTRAVENOUS at 07:34

## 2022-11-07 ASSESSMENT — PAIN DESCRIPTION - DESCRIPTORS
DESCRIPTORS: DULL
DESCRIPTORS: THROBBING

## 2022-11-07 ASSESSMENT — PAIN DESCRIPTION - LOCATION
LOCATION: BUTTOCKS;HIP
LOCATION: BUTTOCKS;HIP
LOCATION: HIP;BUTTOCKS
LOCATION: BUTTOCKS;HIP

## 2022-11-07 ASSESSMENT — PAIN DESCRIPTION - ORIENTATION
ORIENTATION: RIGHT

## 2022-11-07 ASSESSMENT — PAIN SCALES - GENERAL
PAINLEVEL_OUTOF10: 7
PAINLEVEL_OUTOF10: 4
PAINLEVEL_OUTOF10: 7
PAINLEVEL_OUTOF10: 7
PAINLEVEL_OUTOF10: 6
PAINLEVEL_OUTOF10: 4

## 2022-11-07 ASSESSMENT — PAIN - FUNCTIONAL ASSESSMENT
PAIN_FUNCTIONAL_ASSESSMENT: 0-10
PAIN_FUNCTIONAL_ASSESSMENT: 0-10

## 2022-11-07 NOTE — H&P
Interval H&P Note    Pt Name: Emery Lynch  MRN: [id number]  YOB: 1990  Date of evaluation: 11/7/2022      [x] I have reviewed in epic the H&P by [place] PRATER dated 10/17/2022 for an Interval History and Physical note. [x] I have examined  Emery Lynch  There are no changes to the patient who is scheduled for RIGHT SI JOINT FUSION PERCUTANEOUS   SI BONE by Juan Kasper MD for RIGHT SI JOINT DYSFUNCTION. The patient denies new health changes, fever, chills, wheezing, cough, increased SOB, chest pain, open sores or wounds. Denies hx diabetes and taking any blood thinning medications in the last 10 days. Vital signs: BP (!) 145/98   Pulse 84   Resp 20   Ht 5' 3\" (1.6 m)   Wt 260 lb (117.9 kg)   LMP 11/06/2022   SpO2 99%   BMI 46.06 kg/m²     Allergies:  Amoxil [amoxicillin], Pcn [penicillins], Amoxicillin, and Penicillin g    Medications:    Prior to Admission medications    Medication Sig Start Date End Date Taking? Authorizing Provider   metoprolol succinate (TOPROL XL) 50 MG extended release tablet Take 50 mg by mouth daily   Yes Historical Provider, MD   albuterol sulfate  (90 Base) MCG/ACT inhaler Inhale 2 puffs into the lungs 4 times daily as needed for Wheezing or Shortness of Breath 2/18/22   Michael Maldonado MD   SYMBICORT 160-4.5 MCG/ACT AERO Inhale 2 puffs into the lungs daily 12/16/21   Michael Maldonado MD   DULoxetine (CYMBALTA) 20 MG extended release capsule Take 40 mg by mouth daily  10/7/20   Historical Provider, MD         This is a 29 y.o. morbidly obese female who is pleasant, cooperative, alert and oriented x3, in no acute distress. Heart: Heart sounds are normal.  HR 84 regular rate and rhythm without murmur, gallop or rub. Lungs: Normal respiratory effort with equal expansion, good air exchange, unlabored and clear to auscultation without wheezes or rales bilaterally   Abdomen: soft, nontender, nondistended with bowel sounds active.    Extremities: pedal pulses palpable. No pedal edema or calf tenderness. Labs:  Recent Labs     10/17/22  0741   HGB 13.3   HCT 40.8   WBC 11.8*   MCV 83.4         K 4.1      CO2 22   BUN 9   CREATININE 0.65   GLUCOSE 108*   INR 0.9   PROTIME 12.0   APTT 27.9       No results for input(s): COVID19 in the last 720 hours. GILL Paris CNP  Electronically signed 11/7/2022 at 6:33 AM       Sharlett Ar, APRN - CNP   Nurse Practitioner   General Surgery   H&P       Signed   Date of Service:  10/17/2022  7:00 AM          Related encounter: Pre-Admission Testing Visit 30 min from 10/17/2022 in [facility] PRE-ADMIT TESTING           Signed        Expand All Collapse All  History and Physical Service   [facility]      HISTORY AND PHYSICAL EXAMINATION            Date of Evaluation:     10/17/2022  Patient name:              Tereza Szymanski  MRN:                           [id number]  YOB: 1990  PCP:                            GILL Hidalgo CNP     History Obtained From:      Patient, medical records     History of Present Illness: This is Tereza Szymanski a 29 y.o. female who presents for a pre-admission testing appointment for an upcoming RIGHT SI JOINT FUSION PERCUTANEOUS   SI BONE by Jenny Garza MD scheduled on1 1/07/2022 at 0730.  due to RIGHT SI JOINT DYSFUNCTION. The patient's chief complaint is PAIN IN THE RIGHT BUTTOCK RADIATING TO THE RIGHT THIGH AT 6-9 /10 THE RIGHT LEG PAIN HAS CAUSED HER LEG TO GIVE OUT. .SHE  HAS FALLEN. THE  pain which has progressively worsened over the past 3 YEARS  PAIN IS LESS IN THE MORNING AND INCREASES DURING THE DAY. .  THE  pain is aggravated by ACTIVITY  and is minimally relieved with REST. . Prior treatment includes STEROID INJECTIONS. PAIN MEDICATIONS ARE NOT EFFECTIVE. SHE PRESENTS FOR SURGICAL CORRECTION. Sleep apnea questionnaire  1) Do you snore loudly?  NO  2) Do you often feel tired, fatigued, or sleepy? NO  3) Has anyone observed you stop breathing or choking/gasping during your sleep? NO  4) Do you have hypertension? NO  5) BMI >35 kg/m2? YES   6) Age > 50? NO  7) Pt is a male? NO     Functional Capacity:  1) Pt IS able to walk 2 city blocks on level ground without SOB. 2) Pt IS  able to climb 2 flights of stairs without SOB. 3) Pt IS  able to walk up a hill for 1-2 city blocks without SOB. Past Medical History:      Past Medical History        Past Medical History:   Diagnosis Date    YANIRA positive      Anxiety      Asthma      Depression      Headache      Obesity              Past Surgical History:      Past Surgical History         Past Surgical History:   Procedure Laterality Date    CHOLECYSTECTOMY        GALLBLADDER SURGERY   2017    TUBAL LIGATION        WISDOM TOOTH EXTRACTION        WISDOM TOOTH EXTRACTION   2011            Medications Prior to Admission:      Home Medications           Prior to Admission medications    Medication Sig Start Date End Date Taking? Authorizing Provider   albuterol sulfate  (90 Base) MCG/ACT inhaler Inhale 2 puffs into the lungs 4 times daily as needed for Wheezing or Shortness of Breath 2/18/22     Mariluz Liao MD   SYMBICORT 160-4.5 MCG/ACT AERO Inhale 2 puffs into the lungs daily 12/16/21     Mariluz Liao MD   DULoxetine (CYMBALTA) 20 MG extended release capsule Take 40 mg by mouth daily  10/7/20     Historical Provider, MD            Allergies:      Amoxil [amoxicillin], Pcn [penicillins], Amoxicillin, and Penicillin g     Social History:      Tobacco:    reports that she has never smoked. She has never used smokeless tobacco.  Alcohol:      reports that she does not currently use alcohol. Drug Use:  reports no history of drug use.      Family History:      Family History         Family History   Problem Relation Age of Onset    Depression Mother      Obesity Father      Breast Cancer Maternal Grandmother      Cancer Maternal Grandfather Review of Systems:      Positive and Negative as described in HPI. CONSTITUTIONAL:  Negative for fevers, chills, sweats, fatigue, and weight loss. HEENT: WEARS GLASSES NO  hearing changes, rhinorrhea, and throat pain. RESPIRATORY: HAS ASTHMA USES DAILY INHALERS HAS RARE shortness of breath, cough, congestion, and wheezing. CARDIOVASCULAR:  Negative for chest pain, blood clot, irregular heartbeat, and palpitations. GASTROINTESTINAL:  Negative for reflux, nausea, vomiting, diarrhea, constipation, change in bowel habits, and abdominal pain. GENITOURINARY:  Negative for difficulty of urination, burning with urination, and frequency. INTEGUMENT:  Negative for rash, skin lesions, and easy bruising. Instructed pt to call   as soon as possible if a rash or wound develops prior to surgery. Pt voiced understanding. HEMATOLOGIC/LYMPHATIC:  Negative for swelling/edema. ALLERGIC/IMMUNOLOGIC:  Negative for urticaria and itching. ENDOCRINE:  Negative for increase in thirst, increase in urination, and heat or cold intolerance. MUSCULOSKELETAL: See HPI. NEUROLOGICAL:  Negative for headaches, dizziness, lightheadedness, numbness, and tingling extremities. BEHAVIOR/PSYCH: IS BEING TREATED FOR  depression and anxiety. Physical Exam:   BP (!) 157/110   Pulse 88   Temp 97.8 °F (36.6 °C) (Temporal)   Resp 16   Ht 5' 3\" (1.6 m)   Wt 260 lb (117.9 kg)   SpO2 95%   BMI 46.06 kg/m²   No LMP recorded. No obstetric history on file. No results for input(s): POCGLU in the last 72 hours. General Appearance:  Alert, well appearing, and in no acute distress. Mental status:  Oriented to person, place, and time. Head:  Normocephalic and atraumatic. Eye:  No icterus, redness, pupils equal and reactive, extraocular eye movements intact, and conjunctiva clear. Ear:  Hearing grossly intact. Nose:  No drainage noted. Mouth:  Mucous membranes moist.  Neck: Supple and no carotid bruits noted.   Lungs: Bilateral equal air entry, clear to auscultation, no wheezing, rales or rhonchi, and normal effort. Cardiovascular:  Normal rate, regular rhythm, no murmur, gallop, or rub. Abdomen:  Soft, non-tender, non-distended, and active bowel sounds. Neurologic:  Normal speech and cranial nerves II through XII grossly intact. Strength 5/5 bilaterally. Skin:  No gross lesions, rashes, bruising, or bleeding on exposed skin area. Extremities:  Posterior tibial pulses 2+ bilaterally. No pedal edema. No calf tenderness with palpation. Psych:  Normal affect.       Investigations:       Laboratory Testing:  Recent Results         Recent Results (from the past 24 hour(s))   EKG 12 Lead     Collection Time: 10/17/22  7:29 AM   Result Value Ref Range     Ventricular Rate 77 BPM     Atrial Rate 77 BPM     P-R Interval 146 ms     QRS Duration 92 ms     Q-T Interval 380 ms     QTc Calculation (Bazett) 430 ms     P Axis 40 degrees     R Axis 35 degrees     T Axis 29 degrees   CBC with Auto Differential     Collection Time: 10/17/22  7:41 AM   Result Value Ref Range     WBC 11.8 (H) 3.5 - 11.3 k/uL     RBC 4.89 3.95 - 5.11 m/uL     Hemoglobin 13.3 11.9 - 15.1 g/dL     Hematocrit 40.8 36.3 - 47.1 %     MCV 83.4 82.6 - 102.9 fL     MCH 27.2 25.2 - 33.5 pg     MCHC 32.6 28.4 - 34.8 g/dL     RDW 14.0 11.8 - 14.4 %     Platelets 207 347 - 315 k/uL     MPV 9.3 8.1 - 13.5 fL     NRBC Automated 0.0 0.0 per 100 WBC     Seg Neutrophils 61 36 - 65 %     Lymphocytes 29 24 - 43 %     Monocytes 6 3 - 12 %     Eosinophils % 3 1 - 4 %     Basophils 1 0 - 2 %     Immature Granulocytes 0 0 %     Segs Absolute 7.15 1.50 - 8.10 k/uL     Absolute Lymph # 3.40 1.10 - 3.70 k/uL     Absolute Mono # 0.75 0.10 - 1.20 k/uL     Absolute Eos # 0.39 0.00 - 0.44 k/uL     Basophils Absolute 0.09 0.00 - 0.20 k/uL     Absolute Immature Granulocyte 0.05 0.00 - 0.30 k/uL                Recent Labs     10/17/22  0741   HGB 13.3   HCT 40.8   WBC 11.8*   MCV 83.4 No results for input(s): COVID19 in the last 720 hours. *Please note that labs listed above are the most recent lab values available in EPIC at the time of the visit and additional labs may have been drawn or resulted since that time. Imaging/Diagnostics:     SEE Epic   EKG: OF 10/17/2022 ON THE SHORT CHART See [facility]. Diagnosis:       1. RIGHT SI JOINT DYSFUNCTION. Plans:      1.  RIGHT SI JOINT FUSION PERCUTANEOUS   SI BONE        Sp Horton, APRN - CNP  10/17/2022  8:09 AM               Cosigned by: Barber Gonzalez MD at 10/17/2022  8:32 AM     Revision History                      Routing History

## 2022-11-07 NOTE — ANESTHESIA PRE PROCEDURE
Department of Anesthesiology  Preprocedure Note       Name:  Emery Lynch   Age:  29 y.o.  :  1990                                          MRN:  [id number]         Date:  2022      Surgeon: Nyla Farr):  Juan Kasper MD    Procedure: Procedure(s):  RIGHT SI JOINT FUSION PERCUTANEOUS   SI BONE    Medications prior to admission:   Prior to Admission medications    Medication Sig Start Date End Date Taking? Authorizing Provider   metoprolol succinate (TOPROL XL) 50 MG extended release tablet Take 50 mg by mouth daily   Yes Historical Provider, MD   albuterol sulfate  (90 Base) MCG/ACT inhaler Inhale 2 puffs into the lungs 4 times daily as needed for Wheezing or Shortness of Breath 22   Michael Maldonado MD   SYMBICORT 160-4.5 MCG/ACT AERO Inhale 2 puffs into the lungs daily 21   Michael Maldonado MD   DULoxetine (CYMBALTA) 20 MG extended release capsule Take 40 mg by mouth daily  10/7/20   Historical Provider, MD       Current medications:    Current Facility-Administered Medications   Medication Dose Route Frequency Provider Last Rate Last Admin    lidocaine PF 1 % injection 1 mL  1 mL IntraDERmal Once PRN Maggi Johnson MD        lactated ringers infusion   IntraVENous Continuous Maggi Johnson  mL/hr at 22 0648 New Bag at 22 0648    sodium chloride flush 0.9 % injection 5-40 mL  5-40 mL IntraVENous 2 times per day Maggi Johnson MD        sodium chloride flush 0.9 % injection 5-40 mL  5-40 mL IntraVENous PRN Sinan Kaur MD        clindamycin (CLEOCIN) 900 mg in dextrose 5 % 50 mL IVPB  900 mg IntraVENous Once Juan Kasper MD           Allergies: Allergies   Allergen Reactions    Amoxil [Amoxicillin]     Pcn [Penicillins]     Amoxicillin Hives     Other reaction(s): Unknown    Penicillin G Hives     Other reaction(s): Unknown       Problem List:    Patient Active Problem List   Diagnosis Code    Depression F32. A    Anxiety F41.9    Asthma J45.909    TMJ (dislocation of temporomandibular joint) S03. 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    Sacroiliitis (HCC) M46.1    Sacroiliac joint dysfunction of right side M53.3    Elevated erythrocyte sedimentation rate R70.0    Elevated C-reactive protein (CRP) R79.82    Spondylosis of lumbosacral region without myelopathy or radiculopathy M47.817       Past Medical History:        Diagnosis Date    YANIRA positive     Anxiety     Asthma     Depression     Headache     Obesity        Past Surgical History:        Procedure Laterality Date    CHOLECYSTECTOMY      GALLBLADDER SURGERY  2017    TUBAL LIGATION      WISDOM TOOTH EXTRACTION      WISDOM TOOTH EXTRACTION  2011       Social History:    Social History     Tobacco Use    Smoking status: Never    Smokeless tobacco: Never   Substance Use Topics    Alcohol use: Not Currently     Comment: very rare                                Counseling given: Not Answered      Vital Signs (Current):   Vitals:    11/07/22 0618 11/07/22 0624 11/07/22 0644   BP:  (!) 145/98    Pulse:  84    Resp:  20    Temp:   97.5 °F (36.4 °C)   TempSrc:   Temporal   SpO2:  99%    Weight: 260 lb (117.9 kg)     Height: 5' 3\" (1.6 m)                                                BP Readings from Last 3 Encounters:   11/07/22 (!) 145/98   10/17/22 (!) 157/110   01/21/22 132/80       NPO Status: Time of last liquid consumption: 2100                        Time of last solid consumption: 1900                        Date of last liquid consumption: 11/06/22                        Date of last solid food consumption: 11/06/22    BMI:   Wt Readings from Last 3 Encounters:   11/07/22 260 lb (117.9 kg)   10/17/22 260 lb (117.9 kg)   01/21/22 261 lb (118.4 kg)     Body mass index is 46.06 kg/m².     CBC:   Lab Results   Component Value Date/Time    WBC 11.8 10/17/2022 07:41 AM    RBC 4.89 10/17/2022 07:41 AM    HGB 13.3 10/17/2022 07:41 AM    HCT 40.8 10/17/2022 07:41 AM    MCV 83.4 10/17/2022 07:41 AM    RDW 14.0 10/17/2022 07:41 AM     10/17/2022 07:41 AM       CMP:   Lab Results   Component Value Date/Time     10/17/2022 07:41 AM    K 4.1 10/17/2022 07:41 AM     10/17/2022 07:41 AM    CO2 22 10/17/2022 07:41 AM    BUN 9 10/17/2022 07:41 AM    CREATININE 0.65 10/17/2022 07:41 AM    GFRAA >60 12/16/2019 09:42 AM    LABGLOM >60 10/17/2022 07:41 AM    GLUCOSE 108 10/17/2022 07:41 AM    PROT 7.3 12/16/2019 09:42 AM    CALCIUM 9.4 10/17/2022 07:41 AM    BILITOT 0.28 12/16/2019 09:42 AM    ALKPHOS 70 12/16/2019 09:42 AM    AST 20 12/16/2019 09:42 AM    ALT 23 12/16/2019 09:42 AM       POC Tests: No results for input(s): POCGLU, POCNA, POCK, POCCL, POCBUN, POCHEMO, POCHCT in the last 72 hours.     Coags:   Lab Results   Component Value Date/Time    PROTIME 12.0 10/17/2022 07:41 AM    INR 0.9 10/17/2022 07:41 AM    APTT 27.9 10/17/2022 07:41 AM       HCG (If Applicable):   Lab Results   Component Value Date    HCG NEGATIVE 11/07/2022        ABGs: No results found for: PHART, PO2ART, PRF8PUM, TPR6ZNM, BEART, E0UPMDYX     Type & Screen (If Applicable):  No results found for: LABABO, LABRH    Drug/Infectious Status (If Applicable):  No results found for: HIV, HEPCAB    COVID-19 Screening (If Applicable): No results found for: COVID19        Anesthesia Evaluation  Patient summary reviewed and Nursing notes reviewed no history of anesthetic complications:   Airway: Mallampati: II  TM distance: >3 FB   Neck ROM: full  Mouth opening: > = 3 FB   Dental: normal exam         Pulmonary:normal exam    (+) asthma:                            Cardiovascular:  Exercise tolerance: good (>4 METS),       (-) past MI, CAD and CABG/stent        Rate: normal                    Neuro/Psych:   (+) psychiatric history:depression/anxiety             GI/Hepatic/Renal:        (-) GERD       Endo/Other:    (+) : arthritis:., .

## 2022-11-07 NOTE — ANESTHESIA POSTPROCEDURE EVALUATION
Department of Anesthesiology  Postprocedure Note    Patient: Blu Pompa  MRN: [id number]  YOB: 1990  Date of evaluation: 11/7/2022      Procedure Summary     Date: 11/07/22 Room / Location: [facility] 01 / [facility] - INPATIENT    Anesthesia Start: 3002 Anesthesia Stop: 5736    Procedure: RIGHT SI JOINT FUSION PERCUTANEOUS   SI BONE (Right: Back) Diagnosis:       SI (sacroiliac) joint dysfunction      (RIGHT SI JOINT DYSFUNCTION)    Surgeons: Steve Pierre MD Responsible Provider: Lev Augustine DO    Anesthesia Type: general ASA Status: 3          Anesthesia Type: No value filed.     Ilana Phase I: Ilana Score: 9    Ilana Phase II: Ilana Score: 10      Anesthesia Post Evaluation    Patient location during evaluation: PACU  Patient participation: complete - patient participated  Level of consciousness: awake and alert  Airway patency: patent  Nausea & Vomiting: no nausea and no vomiting  Complications: no  Cardiovascular status: hemodynamically stable  Respiratory status: acceptable  Hydration status: stable

## 2022-11-07 NOTE — DISCHARGE INSTRUCTIONS
PWB( partial weight bearing) RLE (right lower extremity) x 3 wks  Dry dressing changes daily x 1wk, start in 3 days  Stiches are dissolvable and do not need to be removed  Call for any fevers, wound redness, swelling or drainage after 4 days [phone]  May shower in 3 days  No tub baths for 3 weeks        MD [facility]  Spine Surgeon  [phone]

## 2022-11-08 NOTE — OP NOTE
Operative Note      Patient: Cathy Gaston  YOB: 1990  MRN: [id number]    Date of Procedure: 11/7/2022    SURGEON:  Christin Quinteros MD.     ANESTHESIA:  General endotracheal anesthesia. PREOPERATIVE DIAGNOSIS:  right sacroiliac dysfunction. POSTOPERATIVE DIAGNOSIS:  right sacroiliac dysfunction. PROCEDURE:  1. right minimally-invasive sacroiliac joint fusion utilizing      the iFuse implant system. 2. Intraoperative use of C-arm fluoroscopy. ESTIMATED BLOOD LOSS:  10 mL. FLUIDS:  Per anesthesia record. COMPLICATIONS:  None. INDICATIONS:  This is a pleasant 74-year-old female  with  history of chronic pain situation. She had done extensive conservative management in terms  of workup for her low back as well as her sacroiliac joints. She  had multiple injections in the sacroiliac joints which did  confirm relived pain. She was having the  continued pain on the right side and had failed conservative  management. It was discussed with him the option of performing  sacroiliac joint fusion on that right side as well. Risk and  benefits were discussed including bleeding, infection, injury to  nerves, vessels, anesthetic risk, the need for possible further  future surgery, as well as the possibility for continued pain,  continued symptoms, possible nonunion. She did understand all  these risks and did wish to proceed and informed consent was  obtained. DESCRIPTION OF PROCEDURE:  The patient was taken to the operating  room, kept supine on his bed. She was intubated, placed under  general anesthesia by the anesthesiologist. She was given  preoperative antibiotic prophylaxis. She was then placed prone on  the [facility] table with towel beneath his chest and pelvis. All  bony prominences were padded. Eyes were kept free of any  pressure. Brachial plexus and elbows were padded as well.  She was  then taped in this position on to the table and the right buttock  and lower back were then prepped and draped in the sterile  fashion. At this point, C-arm fluoroscopy was then brought in in  the sacral ala as well as inline with the sacrum itself was drawn  on the lateral aspect of the skin. Once the appropriate area was  marked, this incision was marked out with a marking pen and this  area was infiltrated with 0.5% Marcaine with epinephrine. Approximately, a 3-cm incision was made along the right lateral  buttock region. Dissection was carried down to the subcutaneous  tissues. Blunt finger dissection was used to carry down through  the subcu as well. At this point, a guidewire was placed through  this opening and placed in a reasonable starting position just  below the sacral ala and inline with the sacrum itself. Lateral  picture confirmed it to be in appropriate position and the mallet  was used to tap the guidewire into the ilium. At this point, the  C-arm was brought into an inlet view and alignment was seen which  showed appropriate trajectory across the SI joint. An outlet view  was then obtained as well, and again showed appropriate  trajectory across the SI joint and above the first sacral  foramen. At this point, the pin  was used to drive the pin  across the SI joint and above the first sacral foramen. The  dilator was placed followed by the guide. Guide was placed over  the guidewire. The pin was then measured and shortened in  appropriate length implant. The inner cannula was then removed. Drill was then used to drill over the guidewire across the SI  joint followed by the broach. Once these were removed, the  implant was then placed over the guidewire and malleted it across  the SI joint under C-arm guidance. Once this was in good  position, the cannula was then removed. Pictures did confirm the  implant to be in excellent position.  At this point, the jig was  placed over the previous guidewire and a new guidewire was placed  distal and slightly more anterior to the first one in line with  the sacroiliac joint in exact same fashion. Drill was placed  across the joint utilizing fluoroscopy followed by placement of  the drill and broach, and finally placement of the implant. Total of 2 implants were placed across the  sacroiliac joint on the right side and guidewires were  subsequently removed. Final pictures were taken which showed all  implants to be in excellent position in outlet views as well as  lateral views. Once this was done, the wound was irrigated with  sterile normal saline with bacitracin followed by placement of  vancomycin powder and closure was then performed with 0-Vicryl  and 2-0 Vicryl, and running 4-0 Monocryl suture with Dermabond  glue. Standard dressing was then applied. She was then placed  supine on the bed, extubated, and taken to recovery room in  stable condition.     Electronically signed by Kulwant Justice MD on 11/7/2022 at 8:41 PM

## (undated) DEVICE — HYPODERMIC SAFETY NEEDLE: Brand: MAGELLAN

## (undated) DEVICE — 3M™ IOBAN™ 2 ANTIMICROBIAL INCISE DRAPE 6650EZ: Brand: IOBAN™ 2

## (undated) DEVICE — SHEET, ORTHO, SPLIT, STERILE: Brand: MEDLINE

## (undated) DEVICE — PREP-RESISTANT MARKER W/ RULER: Brand: MEDLINE INDUSTRIES, INC.

## (undated) DEVICE — SUTURE VCRL SZ 0 L36IN ABSRB UD L36MM CT-1 1/2 CIR J946H

## (undated) DEVICE — YANKAUER,FLEXIBLE HANDLE,REGLR CAPACITY: Brand: MEDLINE INDUSTRIES, INC.

## (undated) DEVICE — INSERT CUSHION HEAD PRONEVIEW

## (undated) DEVICE — PROTECTOR EYE PT SELF ADH NS OPT GRD LF

## (undated) DEVICE — GLOVE SURG SZ 85 CRM LTX FREE POLYISOPRENE POLYMER BEAD ANTI

## (undated) DEVICE — C-ARMOR C-ARM EQUIPMENT COVERS CLEAR STERILE UNIVERSAL FIT 12 PER CASE: Brand: C-ARMOR

## (undated) DEVICE — DRESSING PETRO W3XL8IN OIL EMUL N ADH GZ KNIT IMPREG CELOS

## (undated) DEVICE — ADHESIVE SKIN CLOSURE TOP 36 CC HI VISC DERMBND MINI

## (undated) DEVICE — PIN FIX DIA3.2MM EXCHG DISP FOR IFUSE IMPL SYS

## (undated) DEVICE — 1010 S-DRAPE TOWEL DRAPE 10/BX: Brand: STERI-DRAPE™

## (undated) DEVICE — DRILL SURG DIA7MM CANN

## (undated) DEVICE — DRAPE,REIN 53X77,STERILE: Brand: MEDLINE

## (undated) DEVICE — SUTURE VCRL SZ 2-0 L36IN ABSRB UD L36MM CT-1 1/2 CIR J945H

## (undated) DEVICE — SURGICAL PROCEDURE KIT BASIN MAJ SET UP

## (undated) DEVICE — INTENDED FOR TISSUE SEPARATION, AND OTHER PROCEDURES THAT REQUIRE A SHARP SURGICAL BLADE TO PUNCTURE OR CUT.: Brand: BARD-PARKER ® CARBON RIB-BACK BLADES

## (undated) DEVICE — BLANKET WRM W29.9XL79.1IN UP BODY FORC AIR MISTRAL-AIR

## (undated) DEVICE — C-ARM: Brand: UNBRANDED

## (undated) DEVICE — GOWN,AURORA,NON-REINFORCED,2XL: Brand: MEDLINE

## (undated) DEVICE — SUTURE MCRYL SZ 4-0 L27IN ABSRB UD L19MM PS-2 1/2 CIR PRIM Y426H

## (undated) DEVICE — APPLICATOR MEDICATED 26 CC SOLUTION HI LT ORNG CHLORAPREP

## (undated) DEVICE — GLOVE SURG SZ 85 L12IN FNGR THK79MIL GRN LTX FREE

## (undated) DEVICE — GAUZE, BORDER, 3"X6", 1.5"X4"PAD, STERIL: Brand: MEDLINE INDUSTRIES, INC.

## (undated) DEVICE — SYRINGE 20ML LL S/C 50

## (undated) DEVICE — GUIDEPIN ORTH DIA3.2MM DISP FOR IFUSE IMPL SYS